# Patient Record
Sex: MALE | Race: WHITE | NOT HISPANIC OR LATINO | Employment: OTHER | URBAN - METROPOLITAN AREA
[De-identification: names, ages, dates, MRNs, and addresses within clinical notes are randomized per-mention and may not be internally consistent; named-entity substitution may affect disease eponyms.]

---

## 2017-04-18 PROBLEM — G43.909 MIGRAINE: Status: ACTIVE | Noted: 2017-04-18

## 2017-04-18 PROBLEM — F41.8 SITUATIONAL ANXIETY: Status: ACTIVE | Noted: 2017-04-18

## 2017-11-21 PROBLEM — E78.00 HYPERCHOLESTEROLEMIA: Status: ACTIVE | Noted: 2017-11-21

## 2017-11-21 PROBLEM — G43.009 MIGRAINE WITHOUT AURA AND WITHOUT STATUS MIGRAINOSUS, NOT INTRACTABLE: Status: ACTIVE | Noted: 2017-11-21

## 2017-12-21 ENCOUNTER — TELEPHONE (OUTPATIENT)
Dept: NEUROLOGY | Facility: CLINIC | Age: 37
End: 2017-12-21

## 2017-12-21 NOTE — TELEPHONE ENCOUNTER
Called pt in regards to referral. Pt stated he is not ready to schedule appointment yet, stated will call back at a later time.

## 2018-09-10 ENCOUNTER — TELEPHONE (OUTPATIENT)
Dept: NEUROLOGY | Facility: CLINIC | Age: 38
End: 2018-09-10

## 2018-09-10 NOTE — TELEPHONE ENCOUNTER
----- Message from Dayana Jimenes sent at 9/10/2018 12:34 PM CDT -----  Contact: Patient's fianceNancy  Type:  Sooner Apoointment Request    Caller is requesting a sooner appointment.  Caller declined first available appointment listed below.  Caller will not accept being placed on the waitlist and is requesting a message be sent to doctor.    Name of Caller:  Patient's fiance  When is the first available appointment?  1/16  Symptoms:  Migraines  Best Call Back Number:   or   Additional Information:

## 2018-09-10 NOTE — TELEPHONE ENCOUNTER
Called and spoke with patients dorothy and let her know I added patient to the wait list for sooner appointment. She expressed understanding.

## 2018-12-20 ENCOUNTER — TELEPHONE (OUTPATIENT)
Dept: PHARMACY | Facility: CLINIC | Age: 38
End: 2018-12-20

## 2018-12-20 ENCOUNTER — OFFICE VISIT (OUTPATIENT)
Dept: NEUROLOGY | Facility: CLINIC | Age: 38
End: 2018-12-20
Payer: COMMERCIAL

## 2018-12-20 ENCOUNTER — TELEPHONE (OUTPATIENT)
Dept: NEUROLOGY | Facility: CLINIC | Age: 38
End: 2018-12-20

## 2018-12-20 VITALS
WEIGHT: 192.38 LBS | HEART RATE: 55 BPM | SYSTOLIC BLOOD PRESSURE: 100 MMHG | DIASTOLIC BLOOD PRESSURE: 67 MMHG | HEIGHT: 69 IN | RESPIRATION RATE: 17 BRPM | BODY MASS INDEX: 28.49 KG/M2

## 2018-12-20 DIAGNOSIS — F41.8 SITUATIONAL ANXIETY: ICD-10-CM

## 2018-12-20 DIAGNOSIS — E78.01 FAMILIAL HYPERCHOLESTEROLEMIA: ICD-10-CM

## 2018-12-20 DIAGNOSIS — G43.719 INTRACTABLE CHRONIC MIGRAINE WITHOUT AURA AND WITHOUT STATUS MIGRAINOSUS: ICD-10-CM

## 2018-12-20 DIAGNOSIS — G43.719 INTRACTABLE CHRONIC MIGRAINE WITHOUT AURA AND WITHOUT STATUS MIGRAINOSUS: Primary | ICD-10-CM

## 2018-12-20 DIAGNOSIS — G44.89 CHRONIC MIXED HEADACHE SYNDROME: ICD-10-CM

## 2018-12-20 PROCEDURE — 3008F BODY MASS INDEX DOCD: CPT | Mod: CPTII,S$GLB,, | Performed by: PSYCHIATRY & NEUROLOGY

## 2018-12-20 PROCEDURE — 99999 PR PBB SHADOW E&M-EST. PATIENT-LVL IV: CPT | Mod: PBBFAC,,, | Performed by: PSYCHIATRY & NEUROLOGY

## 2018-12-20 PROCEDURE — 99205 OFFICE O/P NEW HI 60 MIN: CPT | Mod: S$GLB,,, | Performed by: PSYCHIATRY & NEUROLOGY

## 2018-12-20 RX ORDER — KETOROLAC TROMETHAMINE 15.75 MG/1
15.75 SPRAY, METERED NASAL EVERY 6 HOURS
Qty: 5 EACH | Refills: 5 | Status: SHIPPED | OUTPATIENT
Start: 2018-12-20 | End: 2019-07-03 | Stop reason: SDUPTHER

## 2018-12-20 RX ORDER — ZOLMITRIPTAN 5 MG/1
TABLET, ORALLY DISINTEGRATING ORAL
Refills: 14 | COMMUNITY
Start: 2018-11-30 | End: 2019-07-22

## 2018-12-20 RX ORDER — BUTALBITAL, ACETAMINOPHEN AND CAFFEINE 50; 325; 40 MG/1; MG/1; MG/1
TABLET ORAL
Qty: 10 TABLET | Refills: 3 | Status: SHIPPED | OUTPATIENT
Start: 2018-12-20 | End: 2018-12-20 | Stop reason: SDUPTHER

## 2018-12-20 RX ORDER — BUTALBITAL, ACETAMINOPHEN AND CAFFEINE 50; 325; 40 MG/1; MG/1; MG/1
TABLET ORAL
Qty: 10 TABLET | Refills: 3 | Status: SHIPPED | OUTPATIENT
Start: 2018-12-20 | End: 2019-10-09 | Stop reason: SDUPTHER

## 2018-12-20 NOTE — PROGRESS NOTES
Subjective:       Patient ID: Anirudh Stokes is a 38 y.o. male.    Chief Complaint: Headache    HPI   The patient is a pleasant 39 y/o male who comes accompanied by his wife with chief complaint of headache. He has had headaches since he was in [employer]. In time they subsided but they recurred and are now debilitating. He is aware of triggers and avoids them he eats healthy, has lost 25 lbs, he does meditation, stretching, walks and is aware of the importance of lifestyle changes. He has not had an MRI of the brain. He had blood work last year which was significant for hyperlipidemia. Allergy testing reveals that he is allergic to dust, apple, cinnamon, amongst others. He took Topamax for a couple of weeks in the past. He is on Zoloft.  Please see details of headache characteristics below.  Headache questionnaire    1. When did your Headaches start?    Not sure      2. Where are your headaches located?   Not sure      3. Your headache's characteristics:   Throbbing, Pounding, Like a tight band      4. How long does the headache last?   3 Days      5. How often does the headache occur?   weekly      6. Are your headaches preceded or accompanied by other symptoms? yes   If yes, please describe.        7. Does the headache awaken you at night? yes   If so, how often? Only when its really bad. 3 day castro for example        8. Please gage the word that best describes your headache's intensity:    severe      9. Using a scale of 1 through 10, with 0 = no pain and 10 = the worst pain:   What score is your headache now? 0   What score is your headache at its worst? 9   What score is your headache at its best? 0        10. Possible associated headache symptoms:  [x]  Sensitivity to light  [] Dizziness  [x] Nasal or sinus pressure/ pain   [] Sensitivity to noise  [] Vertigo  [x] Problems with concentration  [x] Sensitivity to smells  [] Ringing in ears  [] Problems with memory    [] Blurred vision  [] Irritability  [x]  Problems with task completion   [] Double vision  [] Anger  [x]  Problems with relaxation  [] Loss of appetite  [] Anxiety  [x] Neck tightness, Neck pain  [] Nausea   [] Nasal congestion  [] Vomiting         11. Headache improving factors:  [x] Sleep  [] Heat  [] Darkness  [] Ice  [] Local pressure [] Menses (period)  [x] Massage   [x] Medications:        12. Headache worsening factors:   [] Fatigue [] Sneezing  [x] Changes in Weather  [] Light [] Bending Over [x] Stress  [] Noise [] Ovulation  [] Multiple Sclerosis Flare-Up  [x] Smells  [] Menses  [] Food   [] Coughing [] Alcohol      13. Number of caffeinated drinks per day: 2    14. Number of diet drinks per day:  0      15. Have you seen any other [facility] within the last 3 years?  No  Please Check any Medications Tried for Headache    AED Neuromodulators  MAOIs  Ergot Alkaloids    Acetazolamide (Diamox) [] Phenelzine (Nardil) [] Dihydroergotamine (Migranal) []   Carbamazepine (Tegretol) [] Tranylcypromine (Parnate) [] Ergotamine (Ergomar) []   Gabapentin (Neurontin) [] Antihistamine/Serotonergic  Triptans    Lacosamide (Vimpat) [] Cyproheptadine (Periactin) [] Almotriptan (Axert) []   Lamotrigine (Lamictal) [] Antihypertensives  Eletriptan (Relpax) []   Levatiracetam (Keppra) [] Atenolol (Tenormin) [] Frovatriptan (Frova) []   Oxcarbazepine (Trileptal) [] Bisoprostol (Zebeta) [] Naratriptan (Amerge) []   Phenobarbital [] Candesartan (Atacand) [] Rizatriptan (Maxalt) [x]   Phenytoin (Dilantin) [] Diltiazem (Cardizem) [] Sumatriptan (Imitrex) [x]   Pregabalin (Lyrica) [] Lisinopril (Prinivil, Zestril) [] Zolmitriptan (Zomig) [x]   Primidone (Mysoline) [] Metoprolol (Toprol) [] Combo Abortives    Tiagabine (Gabatril) [] Nadolol (Corgard) [] Butalbital and Acetaminophen (Bupap) []   Topiramate (Topamax)  (Trokendi) [x] Nicardipine (Cardene) []     Vigabatrin (Sabril) [] Nimodipine (Nimotop) [] Butalbital, Acetaminophen, and caffeine (Fioricet) []    Valproic Acid (Depakote) (Divalproex Sodium) [] Propranolol (Inderal) []     Zonisamide (Zonegran) [] Telmisartan (Micardis) [] Butalbital, Aspirin, and caffeine (Fiorinal) []   Benzodiazepines  Timolol (Blocadren) []     Alprazolam (Xanax) [] Verapamil (Calan, Verelan) [] Butalbital, Caffeine, Acetaminophen, and Codeine (Fioricet with Codeine) []   Diazepam (Valium) [] NSAIDs      Lorazepam (Ativan) [] Acetaminophen (Tylenol) [x]     Clonazepam (Klonopin) [] Acetylsalicylic Acid (Aspirin) [x] Butalbital, Caffeine, Aspirin, and Codeine  (Fiorinal with Codeine) []   Antidepressants  Diclofenac (Cambia) []     Amitriptyline (Elavil) [] Ibuprofen (Motrin) []     Desipramine (Norpramin) [] Indomethacin (Indocin) [] Aspirin, Caffeine, and Acetaminophen (Excedrin) (Goodys) [x]   Doxepin (Sinequan) [] Ketoprofen (Orudis) []     Fluoxetine (Prozac) [] Ketorolac (Toradol) [] Acetaminophen, Dichloralphenazone, and Isometheptene (Midrin) []   Imipramine (Tofranil) [] Naproxen (Anaprox) (Aleve) [x]     Nortriptyline (Pamelor) [] Meclofenamic Acid (Meclomen) []     Venlafaxine (Effexor) [] Meloxicam (Mobic) [] Aspirin, Salicylamide, and Caffeine (BC Powder) [x]       Review of Systems   Constitutional: Negative for activity change, appetite change, chills, fatigue and fever.   HENT: Negative for congestion, dental problem, ear pain, hearing loss, sinus pressure, tinnitus, trouble swallowing and voice change.    Eyes: Negative for photophobia, pain and visual disturbance.   Respiratory: Negative for cough, chest tightness and shortness of breath.    Cardiovascular: Negative for chest pain, palpitations and leg swelling.   Gastrointestinal: Negative for abdominal pain, blood in stool, constipation, diarrhea, nausea and vomiting.   Endocrine: Negative for cold intolerance and heat intolerance.   Genitourinary: Negative for difficulty urinating, dysuria and urgency.   Musculoskeletal: Negative for arthralgias, back pain, gait  problem, myalgias, neck pain and neck stiffness.   Skin: Negative for color change, pallor and rash.   Neurological: Positive for headaches. Negative for dizziness, tremors, seizures, syncope, facial asymmetry, speech difficulty, weakness, light-headedness and numbness.   Hematological: Negative for adenopathy. Does not bruise/bleed easily.   Psychiatric/Behavioral: Negative for agitation, behavioral problems, confusion, decreased concentration, self-injury, sleep disturbance and suicidal ideas. The patient is not nervous/anxious.                Past Medical History:   Diagnosis Date    Allergic to bees     Migraine      History reviewed. No pertinent surgical history.  Family History   Problem Relation Age of Onset    Diabetes Father     Colon polyps Father     Colon cancer Paternal Grandfather      Social History     Socioeconomic History    Marital status: Single     Spouse name: Not on file    Number of children: Not on file    Years of education: Not on file    Highest education level: Not on file   Social Needs    Financial resource strain: Not on file    Food insecurity - worry: Not on file    Food insecurity - inability: Not on file    Transportation needs - medical: Not on file    Transportation needs - non-medical: Not on file   Occupational History    Not on file   Tobacco Use    Smoking status: Never Smoker    Smokeless tobacco: Never Used   Substance and Sexual Activity    Alcohol use: Yes     Alcohol/week: 0.6 oz     Types: 1 Shots of liquor per week     Comment: once or twice on the weekend    Drug use: No    Sexual activity: Yes     Partners: Female   Other Topics Concern    Not on file   Social History Narrative    Not on file     Review of patient's allergies indicates:   Allergen Reactions    Apple     Bee pollens     Gluten protein     Nuts [tree nut]     Shellfish containing products     Wheat containing prod        Current Outpatient Medications:     epinephrine  (EPIPEN JR) 0.15 mg/0.3 mL pen injection, Inject 0.15 mg into the muscle as needed for Anaphylaxis., Disp: , Rfl:     levocetirizine (XYZAL) 5 MG tablet, Take 5 mg by mouth every evening., Disp: , Rfl:     pseudoephedrine-guaifenesin  mg (MUCINEX D)  mg per tablet, Take 1 tablet by mouth every 12 (twelve) hours., Disp: , Rfl:     rizatriptan (MAXALT) 10 MG tablet, TK 1 T PO PRF HA, Disp: , Rfl: 1    sertraline (ZOLOFT) 50 MG tablet, TAKE 1 TABLET(50 MG) BY MOUTH EVERY DAY, Disp: 30 tablet, Rfl: 14    ZOLMitriptan (ZOMIG-ZMT) 5 MG disintegrating tablet, DIS 1 T ON THE TONGUE AOS OF HEADACHE. MAY REPEAT IN 2 H. NTE 2 TS PER 24 H, Disp: , Rfl: 14    butalbital-acetaminophen-caffeine -40 mg (FIORICET, ESGIC) -40 mg per tablet, Take if Sprix and Zomig ineffective., Disp: 10 tablet, Rfl: 3    galcanezumab-gnlm (EMGALITY) 120 mg/mL PnIj, Inject 120 mg into the skin every 28 days., Disp: 1 mL, Rfl: 5    ketorolac (SPRIX) 15.75 mg/spray Spry, 15.75 mg by Nasal route every 6 (six) hours., Disp: 5 each, Rfl: 5    ZOLMitriptan (ZOMIG) 5 MG tablet, Take 1 at onset of migraine, may repeat in 2 hrs if needed; not to exceed 2 per 24 hrs., Disp: 10 tablet, Rfl: 6      Objective:      Vitals:    12/20/18 0934   BP: 100/67   Pulse: (!) 55   Resp:      Body mass index is 28.41 kg/m².    Physical Exam    Constitutional:     He appears well-developed and well-nourished. He is well groomed  HENT:    Head: Atraumatic, oral and nasal mucosa intact  Eyes: Conjunctivae and EOM are normal. Pupils are equal, round, and reactive to light OU  Neck: Neck supple. No thyromegaly present  Cardiovascular: Normal rate and normal heart sounds  No murmur heard  Pulmonary/Chest: Effort normal and breath sounds normal  Musculoskeletal: Normal range of motion. No joint stiffness. No vertebral point tenderness  Skin: Skin is warm and dry  Psychiatric: Normal mood and affect     Neuro exam:    Mental status:  Awake, attentive,  Alert, oriented to self, place, year and month  Language function is intact  Naming, repetition and spontaneous meaningful speech expression are intact  Speech fluency is good and speech is clear  Remote and recent memory are good  Patient able to count backwards by 7    No findings to suggest executive dysfuntion    Patient has adequate insight      Cranial Nerves:  Smell was not formally evaluated  Cranial Nerves II - XII: intact  Pursuits were smooth, normal saccades, no nystagmus OU  Funduscopic exam - disc were flat and pink, no exudates or hemorrhages OU  Motor - facial movement was symmetrical and normal     Palate moved well and was symmetrical with normal palatal and oral sensation  Tongue movements were full    Coordination:     Rapid alternating movements and rapid finger tapping - normal bilaterally  Finger to nose - normal and symmetric bilaterally   Heel to shin test - normal and symmetric bilaterally   Arm roll - smooth and symmetric   No intentional or positional tremor.     Motor:  Normal muscle bulk and symmetry. No fasciculations were noted    No pronator drift  Strength 5/5 bilaterally     Reflexes:  Tendon reflexes were 2 + at biceps, triceps, brachioradialis, patellar, and Achilles bilaterally  On plantar stimulation toes were down going bilaterally  No clonus was noted     Sensory: Intact to light touch, pin prick in all extremities.     Gait: Romberg absent. Normal gait.     Review of Data:  Lab Results   Component Value Date     05/17/2017    K 4.5 05/17/2017    CL 96 05/17/2017    CO2 21 05/17/2017    BUN 21 (H) 05/17/2017    CREATININE 0.94 05/17/2017    GLU 86 05/17/2017    AST 27 05/17/2017    ALT 33 05/17/2017    ALBUMIN 5.0 05/17/2017    PROT 7.3 05/17/2017    BILITOT 0.5 05/17/2017    CHOL 312 (H) 05/17/2017    HDL 51 05/17/2017    LDLCALC 213 (H) 05/17/2017    TRIG 240 (H) 05/17/2017       Lab Results   Component Value Date    WBC 5.2 05/17/2017    HGB 14.7 05/17/2017    HCT  44.2 05/17/2017    MCV 91 05/17/2017     05/17/2017           Assessment and Plan   Chronic mixed headache syndrome  -     MRI Brain Without Contrast; Future; Expected date: 12/20/2018  -     CBC auto differential; Future; Expected date: 12/20/2018  -     Comprehensive metabolic panel; Future; Expected date: 12/20/2018  -     TSH; Future; Expected date: 12/20/2018  Familial hypercholesterolemia  -     LIPID PANEL; Future; Expected date: 12/20/2018  Other orders  -     ketorolac (SPRIX) 15.75 mg/spray Spry; 15.75 mg by Nasal route every 6 (six) hours.  Dispense: 5 each; Refill: 5  -     galcanezumab-gnlm (EMGALITY) 120 mg/mL PnIj; Inject 120 mg into the skin every 28 days.  Dispense: 1 mL; Refill: 5  -     Discontinue: butalbital-acetaminophen-caffeine -40 mg (FIORICET, ESGIC) -40 mg per tablet; Take if Sprix and Zomig ineffective.  Dispense: 10 tablet; Refill: 3    I have discussed the side effects of the medications prescribed and the patient acknowledges understanding    Counseling:  More than 50% of the 60 minute encounter was spent face to face counseling the patient regarding current status and future plan of care as well as side of the medications. All questions were answered to patient's satisfaction            Tashi Munoz M.D  Medical Director, Headache and Facial Pain  [facility]

## 2018-12-20 NOTE — PROGRESS NOTES
Headache questionnaire    1. When did your Headaches start?    Not sure      2. Where are your headaches located?   Not sure      3. Your headache's characteristics:   Throbbing, Pounding, Like a tight band      4. How long does the headache last?   3 Days      5. How often does the headache occur?   weekly      6. Are your headaches preceded or accompanied by other symptoms? yes   If yes, please describe.        7. Does the headache awaken you at night? yes   If so, how often? Only when its really bad. 3 day castro for example        8. Please gage the word that best describes your headache's intensity:    severe      9. Using a scale of 1 through 10, with 0 = no pain and 10 = the worst pain:   What score is your headache now? 0   What score is your headache at its worst? 9   What score is your headache at its best? 0        10. Possible associated headache symptoms:  [x]  Sensitivity to light  [] Dizziness  [x] Nasal or sinus pressure/ pain   [] Sensitivity to noise  [] Vertigo  [x] Problems with concentration  [x] Sensitivity to smells  [] Ringing in ears  [] Problems with memory    [] Blurred vision  [] Irritability  [x] Problems with task completion   [] Double vision  [] Anger  [x]  Problems with relaxation  [] Loss of appetite  [] Anxiety  [x] Neck tightness, Neck pain  [] Nausea   [] Nasal congestion  [] Vomiting         11. Headache improving factors:  [x] Sleep  [] Heat  [] Darkness  [] Ice  [] Local pressure [] Menses (period)  [x] Massage   [x] Medications:        12. Headache worsening factors:   [] Fatigue [] Sneezing  [x] Changes in Weather  [] Light [] Bending Over [x] Stress  [] Noise [] Ovulation  [] Multiple Sclerosis Flare-Up  [x] Smells  [] Menses  [] Food   [] Coughing [] Alcohol      13. Number of caffeinated drinks per day: 2    14. Number of diet drinks per day:  0      15. Have you seen any other [facility]ists within the last 3 years?  No  Please Check any Medications Tried for  Headache    AED Neuromodulators  MAOIs  Ergot Alkaloids    Acetazolamide (Diamox) [] Phenelzine (Nardil) [] Dihydroergotamine (Migranal) []   Carbamazepine (Tegretol) [] Tranylcypromine (Parnate) [] Ergotamine (Ergomar) []   Gabapentin (Neurontin) [] Antihistamine/Serotonergic  Triptans    Lacosamide (Vimpat) [] Cyproheptadine (Periactin) [] Almotriptan (Axert) []   Lamotrigine (Lamictal) [] Antihypertensives  Eletriptan (Relpax) []   Levatiracetam (Keppra) [] Atenolol (Tenormin) [] Frovatriptan (Frova) []   Oxcarbazepine (Trileptal) [] Bisoprostol (Zebeta) [] Naratriptan (Amerge) []   Phenobarbital [] Candesartan (Atacand) [] Rizatriptan (Maxalt) [x]   Phenytoin (Dilantin) [] Diltiazem (Cardizem) [] Sumatriptan (Imitrex) [x]   Pregabalin (Lyrica) [] Lisinopril (Prinivil, Zestril) [] Zolmitriptan (Zomig) [x]   Primidone (Mysoline) [] Metoprolol (Toprol) [] Combo Abortives    Tiagabine (Gabatril) [] Nadolol (Corgard) [] Butalbital and Acetaminophen (Bupap) []   Topiramate (Topamax)  (Trokendi) [x] Nicardipine (Cardene) []     Vigabatrin (Sabril) [] Nimodipine (Nimotop) [] Butalbital, Acetaminophen, and caffeine (Fioricet) []   Valproic Acid (Depakote) (Divalproex Sodium) [] Propranolol (Inderal) []     Zonisamide (Zonegran) [] Telmisartan (Micardis) [] Butalbital, Aspirin, and caffeine (Fiorinal) []   Benzodiazepines  Timolol (Blocadren) []     Alprazolam (Xanax) [] Verapamil (Calan, Verelan) [] Butalbital, Caffeine, Acetaminophen, and Codeine (Fioricet with Codeine) []   Diazepam (Valium) [] NSAIDs      Lorazepam (Ativan) [] Acetaminophen (Tylenol) [x]     Clonazepam (Klonopin) [] Acetylsalicylic Acid (Aspirin) [x] Butalbital, Caffeine, Aspirin, and Codeine  (Fiorinal with Codeine) []   Antidepressants  Diclofenac (Cambia) []     Amitriptyline (Elavil) [] Ibuprofen (Motrin) []     Desipramine (Norpramin) [] Indomethacin (Indocin) [] Aspirin, Caffeine, and Acetaminophen (Excedrin) (Goodys) [x]   Doxepin (Sinequan)  [] Ketoprofen (Orudis) []     Fluoxetine (Prozac) [] Ketorolac (Toradol) [] Acetaminophen, Dichloralphenazone, and Isometheptene (Midrin) []   Imipramine (Tofranil) [] Naproxen (Anaprox) (Aleve) [x]     Nortriptyline (Pamelor) [] Meclofenamic Acid (Meclomen) []     Venlafaxine (Effexor) [] Meloxicam (Mobic) [] Aspirin, Salicylamide, and Caffeine (BC Powder) [x]

## 2018-12-20 NOTE — LETTER
December 20, 2018      KSENIA Dunne Jr., MD  [address]  [facility] [address]           [facility] [place]  [address] [facility] [address] [place] [address]  Phone: [phone]  Fax: [phone]          Patient: Anirudh Stokes   MR Number: [id number]   YOB: 1980   Date of Visit: 12/20/2018       Dear Dr. KSENIA Dunne Jr.:    Thank you for referring Anirudh Stokes to me for evaluation. Attached you will find relevant portions of my assessment and plan of care.    If you have questions, please do not hesitate to call me. I look forward to following Anirudh Stokes along with you.    Sincerely,    Funmilayo Munoz MD    Enclosure  CC:  No Recipients    If you would like to receive this communication electronically, please contact [email or web address] or [phone] to request more information on [facility] access.    For providers and/or their staff who would like to refer a patient to [facility], please contact us through our one-stop-shop provider referral line, [facility], at [phone].    If you feel you have received this communication in error or would no longer like to receive these types of communications, please e-mail [email or web address]

## 2018-12-20 NOTE — TELEPHONE ENCOUNTER
Called patient in regards to do a well fare check per provider. Patient did not answer , left voicemail to return call.

## 2018-12-20 NOTE — PATIENT INSTRUCTIONS
PLAN:  MRI of the brain  CBC, CMP, TSH, Lipid panel  Prevention:  1) Emgality, loading dose of 240 mg SC, then 120 mg SC every 28 thereafter  2) Flexeril (cyclobenzaprine) 10 mg po QHS  Treatment of acute attack:  Sprix alternating with Zomig or both, Sprix plus Zomig for severe attacks  Fioricet for rescue if headache persists in spite of use of Sprix and Zomig

## 2018-12-21 ENCOUNTER — TELEPHONE (OUTPATIENT)
Dept: NEUROLOGY | Facility: CLINIC | Age: 38
End: 2018-12-21

## 2018-12-21 NOTE — TELEPHONE ENCOUNTER
DOCUMENTATION ONLY:  Prior Authorization for Emgality approved from 12/21/18 to 12/21/19    Case Id: [id number]    Co-pay: $610.32    Patient Assistance IS required and is being researched.     -ARR

## 2018-12-22 ENCOUNTER — LAB VISIT (OUTPATIENT)
Dept: LAB | Facility: HOSPITAL | Age: 38
End: 2018-12-22
Attending: PSYCHIATRY & NEUROLOGY
Payer: COMMERCIAL

## 2018-12-22 DIAGNOSIS — G44.89 CHRONIC MIXED HEADACHE SYNDROME: ICD-10-CM

## 2018-12-22 DIAGNOSIS — E78.01 FAMILIAL HYPERCHOLESTEROLEMIA: ICD-10-CM

## 2018-12-22 LAB
ALBUMIN SERPL BCP-MCNC: 4.2 G/DL
ALP SERPL-CCNC: 55 U/L
ALT SERPL W/O P-5'-P-CCNC: 30 U/L
ANION GAP SERPL CALC-SCNC: 8 MMOL/L
AST SERPL-CCNC: 23 U/L
BASOPHILS # BLD AUTO: 0.06 K/UL
BASOPHILS NFR BLD: 1.1 %
BILIRUB SERPL-MCNC: 0.6 MG/DL
BUN SERPL-MCNC: 18 MG/DL
CALCIUM SERPL-MCNC: 10 MG/DL
CHLORIDE SERPL-SCNC: 101 MMOL/L
CHOLEST SERPL-MCNC: 292 MG/DL
CHOLEST/HDLC SERPL: 5.4 {RATIO}
CO2 SERPL-SCNC: 29 MMOL/L
CREAT SERPL-MCNC: 1.1 MG/DL
DIFFERENTIAL METHOD: NORMAL
EOSINOPHIL # BLD AUTO: 0.1 K/UL
EOSINOPHIL NFR BLD: 2.6 %
ERYTHROCYTE [DISTWIDTH] IN BLOOD BY AUTOMATED COUNT: 12.8 %
EST. GFR  (AFRICAN AMERICAN): >60 ML/MIN/1.73 M^2
EST. GFR  (NON AFRICAN AMERICAN): >60 ML/MIN/1.73 M^2
GLUCOSE SERPL-MCNC: 98 MG/DL
HCT VFR BLD AUTO: 42.1 %
HDLC SERPL-MCNC: 54 MG/DL
HDLC SERPL: 18.5 %
HGB BLD-MCNC: 14.5 G/DL
IMM GRANULOCYTES # BLD AUTO: 0.01 K/UL
IMM GRANULOCYTES NFR BLD AUTO: 0.2 %
LDLC SERPL CALC-MCNC: 204.8 MG/DL
LYMPHOCYTES # BLD AUTO: 2 K/UL
LYMPHOCYTES NFR BLD: 36.1 %
MCH RBC QN AUTO: 29.7 PG
MCHC RBC AUTO-ENTMCNC: 34.4 G/DL
MCV RBC AUTO: 86 FL
MONOCYTES # BLD AUTO: 0.4 K/UL
MONOCYTES NFR BLD: 7.5 %
NEUTROPHILS # BLD AUTO: 2.9 K/UL
NEUTROPHILS NFR BLD: 52.5 %
NONHDLC SERPL-MCNC: 238 MG/DL
NRBC BLD-RTO: 0 /100 WBC
PLATELET # BLD AUTO: 192 K/UL
PMV BLD AUTO: 10.7 FL
POTASSIUM SERPL-SCNC: 4.4 MMOL/L
PROT SERPL-MCNC: 7.4 G/DL
RBC # BLD AUTO: 4.88 M/UL
SODIUM SERPL-SCNC: 138 MMOL/L
TRIGL SERPL-MCNC: 166 MG/DL
TSH SERPL DL<=0.005 MIU/L-ACNC: 2.11 UIU/ML
WBC # BLD AUTO: 5.48 K/UL

## 2018-12-22 PROCEDURE — 84443 ASSAY THYROID STIM HORMONE: CPT

## 2018-12-22 PROCEDURE — 80061 LIPID PANEL: CPT

## 2018-12-22 PROCEDURE — 80053 COMPREHEN METABOLIC PANEL: CPT

## 2018-12-22 PROCEDURE — 85025 COMPLETE CBC W/AUTO DIFF WBC: CPT

## 2018-12-22 PROCEDURE — 36415 COLL VENOUS BLD VENIPUNCTURE: CPT | Mod: PO

## 2019-01-03 ENCOUNTER — TELEPHONE (OUTPATIENT)
Dept: NEUROLOGY | Facility: CLINIC | Age: 39
End: 2019-01-03

## 2019-01-17 ENCOUNTER — TELEPHONE (OUTPATIENT)
Dept: PHARMACY | Facility: CLINIC | Age: 39
End: 2019-01-17

## 2019-01-17 NOTE — TELEPHONE ENCOUNTER
Initial Emgality consult completed on . Emgality 120mg will be shipped on  to arrive at patient's home on  via [employer]. $0 copay. Patient injected 1st dose on  and will inject 2nd dose once received on . Address confirmed. Confirmed 2 patient identifiers - name and . Therapy Appropriate.    --Injection experience: Emgality  Informed patient on online injection video on  website.    Counseled patient on administration directions:  - Inject one injection (120mg) once every 28 days thereafter.   - Store in refrigerator prior to use (do not freeze, do not shake, keep in original box until use).   - Take out of the refrigerator 30 minutes prior to injection to reach room temperature.  - Wash hands before and after injection.  - Monthly RX will come with gauze, band aids, and alcohol swabs.  - Patient may self-inject in either the front/top of the thighs, abdomen- but at least 2 inches away from belly button   - If someone else is giving the injection, they may also use the outer part of your upper arm or your buttocks.   - Patient was instructed to rotate injections sites monthly.  - Inspect medication - should be clear and colorless to slightly yellow to slightly brown.   - Patient is to wipe down the injection site with the alcohol pad, wait to dry.    - Remove white base cap from pen (twist to remove).  - Place the pen flat against the injection site and turn the lock ring to the unlocked position then push down and hold the teal button - there will be an initial click; in 10 seconds you will hear a second click.  - Remove the pen from skin and check to see if the gray plunger is visible - this will indicate that the injection is complete.   - Patient will use sharps container; once full, per state law, she/he may securely lock the sharps container and place in trash. Pharmacy will replace the sharps at no additional charge.    Patient was counseled on possible side effects:  -  Injection site reaction: redness, soreness, itching, bruising, which should resolve within 3-5 days.  - Allergic reactions: itching, rash, hives, swelling of face, mouth, tongue, throat, trouble breathing.     Advised patient to keep a calendar to stay compliant.   Consultation included: indication; goals of treatment; administration; storage and handling; side effects; how to handle side effects; the importance of compliance; the importance of keeping all follow up appointments.  Patient understands to report any medication changes to [facility] and provider. All questions answered and addressed to patients satisfaction. I will f/u with patient in 7-10 days from start, [facility] to contact patient in 3 weeks for refills.    InBasket sent 12:18 pm on 1/17/19    Miguel Jasso, PharmD  Clinical Pharmacist   [facility]   P: [phone]

## 2019-02-07 ENCOUNTER — TELEPHONE (OUTPATIENT)
Dept: PHARMACY | Facility: CLINIC | Age: 39
End: 2019-02-07

## 2019-02-07 NOTE — TELEPHONE ENCOUNTER
Patient reached regard specialty medication refill for Emgality 120mg/mL (1mL/28) $0/004- Patient scheduled to have medication ship out on Tues 2/12 to arrive on Wed 2/13 address confirmed/prescription address. Patient informed no new medications, conditions or allergies since last talked to [facility]. Patient have no injection remaining & no missed dose. Patient declined questions for the clinical pharmacist. Patient voiced understanding.     Next injection due on: 2/15  Prescription Address:   [address] [place] [address]   Yes

## 2019-02-14 ENCOUNTER — TELEPHONE (OUTPATIENT)
Dept: NEUROLOGY | Facility: CLINIC | Age: 39
End: 2019-02-14

## 2019-02-14 NOTE — TELEPHONE ENCOUNTER
----- Message from Dayana Jimenes sent at 2/14/2019  2:49 PM CST -----  Contact: Patient's wife, Freya  Patient's wife is calling to speak with the office regarding some labs that the patient had in December and also a nasal spray, ketorolac (SPRIX) 15.75 mg/spray Spry, for the patient.  Call Back#[phone]  Thanks

## 2019-02-14 NOTE — TELEPHONE ENCOUNTER
Returned wife's call in regards to billing for lab work. Informed wife I will call billing in the morning to help aid in this situation. Wife verbalized understanding.

## 2019-02-15 ENCOUNTER — TELEPHONE (OUTPATIENT)
Dept: NEUROLOGY | Facility: CLINIC | Age: 39
End: 2019-02-15

## 2019-03-06 ENCOUNTER — TELEPHONE (OUTPATIENT)
Dept: PHARMACY | Facility: CLINIC | Age: 39
End: 2019-03-06

## 2019-04-02 ENCOUNTER — TELEPHONE (OUTPATIENT)
Dept: PHARMACY | Facility: CLINIC | Age: 39
End: 2019-04-02

## 2019-04-03 ENCOUNTER — OFFICE VISIT (OUTPATIENT)
Dept: NEUROLOGY | Facility: CLINIC | Age: 39
End: 2019-04-03
Payer: COMMERCIAL

## 2019-04-03 VITALS
RESPIRATION RATE: 16 BRPM | BODY MASS INDEX: 29.26 KG/M2 | HEIGHT: 69 IN | HEART RATE: 74 BPM | DIASTOLIC BLOOD PRESSURE: 80 MMHG | WEIGHT: 197.56 LBS | SYSTOLIC BLOOD PRESSURE: 125 MMHG

## 2019-04-03 DIAGNOSIS — G43.719 INTRACTABLE CHRONIC MIGRAINE WITHOUT AURA AND WITHOUT STATUS MIGRAINOSUS: Primary | ICD-10-CM

## 2019-04-03 DIAGNOSIS — E78.01 FAMILIAL HYPERCHOLESTEROLEMIA: ICD-10-CM

## 2019-04-03 PROCEDURE — 99214 OFFICE O/P EST MOD 30 MIN: CPT | Mod: S$GLB,,, | Performed by: PSYCHIATRY & NEUROLOGY

## 2019-04-03 PROCEDURE — 99214 PR OFFICE/OUTPT VISIT, EST, LEVL IV, 30-39 MIN: ICD-10-PCS | Mod: S$GLB,,, | Performed by: PSYCHIATRY & NEUROLOGY

## 2019-04-03 PROCEDURE — 99999 PR PBB SHADOW E&M-EST. PATIENT-LVL IV: CPT | Mod: PBBFAC,,, | Performed by: PSYCHIATRY & NEUROLOGY

## 2019-04-03 PROCEDURE — 99999 PR PBB SHADOW E&M-EST. PATIENT-LVL IV: ICD-10-PCS | Mod: PBBFAC,,, | Performed by: PSYCHIATRY & NEUROLOGY

## 2019-04-03 PROCEDURE — 3008F BODY MASS INDEX DOCD: CPT | Mod: CPTII,S$GLB,, | Performed by: PSYCHIATRY & NEUROLOGY

## 2019-04-03 PROCEDURE — 3008F PR BODY MASS INDEX (BMI) DOCUMENTED: ICD-10-PCS | Mod: CPTII,S$GLB,, | Performed by: PSYCHIATRY & NEUROLOGY

## 2019-04-03 RX ORDER — CYCLOBENZAPRINE HCL 10 MG
TABLET ORAL
Refills: 1 | COMMUNITY
Start: 2019-02-16 | End: 2019-04-15 | Stop reason: SDUPTHER

## 2019-04-04 NOTE — PROGRESS NOTES
Subjective:       Patient ID: Anirudh Stokes is a 38 y.o. male.    Chief Complaint: Headache  INTERVAL HISTORY 4/3/2019  The patient comes for follow up. He is significantly improved on Emgality for prevention with a noticeable decrease in the frequency and severity of the headaches. For acute attacks he takes Sprix and Zomig, which works particularly well. He has not had the MRI of the brain as he was between insurance companies and unsure of coverage.    HPI   The patient is a pleasant 37 y/o male who comes accompanied by his wife with chief complaint of headache. He has had headaches since he was in [employer]. In time they subsided but they recurred and are now debilitating. He is aware of triggers and avoids them he eats healthy, has lost 25 lbs, he does meditation, stretching, walks and is aware of the importance of lifestyle changes. He has not had an MRI of the brain. He had blood work last year which was significant for hyperlipidemia. Allergy testing reveals that he is allergic to dust, apple, cinnamon, amongst others. He took Topamax for a couple of weeks in the past. He is on Zoloft.  Please see details of headache characteristics below.  Headache questionnaire    1. When did your Headaches start?    Not sure      2. Where are your headaches located?   Not sure      3. Your headache's characteristics:   Throbbing, Pounding, Like a tight band      4. How long does the headache last?   3 Days      5. How often does the headache occur?   weekly      6. Are your headaches preceded or accompanied by other symptoms? yes   If yes, please describe.        7. Does the headache awaken you at night? yes   If so, how often? Only when its really bad. 3 day castro for example        8. Please gage the word that best describes your headache's intensity:    severe      9. Using a scale of 1 through 10, with 0 = no pain and 10 = the worst pain:   What score is your headache now? 0   What score is your headache at its  worst? 9   What score is your headache at its best? 0        10. Possible associated headache symptoms:  [x]  Sensitivity to light  [] Dizziness  [x] Nasal or sinus pressure/ pain   [] Sensitivity to noise  [] Vertigo  [x] Problems with concentration  [x] Sensitivity to smells  [] Ringing in ears  [] Problems with memory    [] Blurred vision  [] Irritability  [x] Problems with task completion   [] Double vision  [] Anger  [x]  Problems with relaxation  [] Loss of appetite  [] Anxiety  [x] Neck tightness, Neck pain  [] Nausea   [] Nasal congestion  [] Vomiting         11. Headache improving factors:  [x] Sleep  [] Heat  [] Darkness  [] Ice  [] Local pressure [] Menses (period)  [x] Massage   [x] Medications:        12. Headache worsening factors:   [] Fatigue [] Sneezing  [x] Changes in Weather  [] Light [] Bending Over [x] Stress  [] Noise [] Ovulation  [] Multiple Sclerosis Flare-Up  [x] Smells  [] Menses  [] Food   [] Coughing [] Alcohol      13. Number of caffeinated drinks per day: 2    14. Number of diet drinks per day:  0      15. Have you seen any other [facility] within the last 3 years?  No  Please Check any Medications Tried for Headache    AED Neuromodulators  MAOIs  Ergot Alkaloids    Acetazolamide (Diamox) [] Phenelzine (Nardil) [] Dihydroergotamine (Migranal) []   Carbamazepine (Tegretol) [] Tranylcypromine (Parnate) [] Ergotamine (Ergomar) []   Gabapentin (Neurontin) [] Antihistamine/Serotonergic  Triptans    Lacosamide (Vimpat) [] Cyproheptadine (Periactin) [] Almotriptan (Axert) []   Lamotrigine (Lamictal) [] Antihypertensives  Eletriptan (Relpax) []   Levatiracetam (Keppra) [] Atenolol (Tenormin) [] Frovatriptan (Frova) []   Oxcarbazepine (Trileptal) [] Bisoprostol (Zebeta) [] Naratriptan (Amerge) []   Phenobarbital [] Candesartan (Atacand) [] Rizatriptan (Maxalt) [x]   Phenytoin (Dilantin) [] Diltiazem (Cardizem) [] Sumatriptan (Imitrex) [x]   Pregabalin (Lyrica) [] Lisinopril  (Prinivil, Zestril) [] Zolmitriptan (Zomig) [x]   Primidone (Mysoline) [] Metoprolol (Toprol) [] Combo Abortives    Tiagabine (Gabatril) [] Nadolol (Corgard) [] Butalbital and Acetaminophen (Bupap) []   Topiramate (Topamax)  (Trokendi) [x] Nicardipine (Cardene) []     Vigabatrin (Sabril) [] Nimodipine (Nimotop) [] Butalbital, Acetaminophen, and caffeine (Fioricet) []   Valproic Acid (Depakote) (Divalproex Sodium) [] Propranolol (Inderal) []     Zonisamide (Zonegran) [] Telmisartan (Micardis) [] Butalbital, Aspirin, and caffeine (Fiorinal) []   Benzodiazepines  Timolol (Blocadren) []     Alprazolam (Xanax) [] Verapamil (Calan, Verelan) [] Butalbital, Caffeine, Acetaminophen, and Codeine (Fioricet with Codeine) []   Diazepam (Valium) [] NSAIDs      Lorazepam (Ativan) [] Acetaminophen (Tylenol) [x]     Clonazepam (Klonopin) [] Acetylsalicylic Acid (Aspirin) [x] Butalbital, Caffeine, Aspirin, and Codeine  (Fiorinal with Codeine) []   Antidepressants  Diclofenac (Cambia) []     Amitriptyline (Elavil) [] Ibuprofen (Motrin) []     Desipramine (Norpramin) [] Indomethacin (Indocin) [] Aspirin, Caffeine, and Acetaminophen (Excedrin) (Goodys) [x]   Doxepin (Sinequan) [] Ketoprofen (Orudis) []     Fluoxetine (Prozac) [] Ketorolac (Toradol) [] Acetaminophen, Dichloralphenazone, and Isometheptene (Midrin) []   Imipramine (Tofranil) [] Naproxen (Anaprox) (Aleve) [x]     Nortriptyline (Pamelor) [] Meclofenamic Acid (Meclomen) []     Venlafaxine (Effexor) [] Meloxicam (Mobic) [] Aspirin, Salicylamide, and Caffeine (BC Powder) [x]       Review of Systems   Constitutional: Negative for activity change, appetite change, chills, fatigue and fever.   HENT: Negative for congestion, dental problem, ear pain, hearing loss, sinus pressure, tinnitus, trouble swallowing and voice change.    Eyes: Negative for photophobia, pain and visual disturbance.   Respiratory: Negative for cough, chest tightness and shortness of breath.    Cardiovascular:  Negative for chest pain, palpitations and leg swelling.   Gastrointestinal: Negative for abdominal pain, blood in stool, constipation, diarrhea, nausea and vomiting.   Endocrine: Negative for cold intolerance and heat intolerance.   Genitourinary: Negative for difficulty urinating, dysuria and urgency.   Musculoskeletal: Negative for arthralgias, back pain, gait problem, myalgias, neck pain and neck stiffness.   Skin: Negative for color change, pallor and rash.   Neurological: Positive for headaches. Negative for dizziness, tremors, seizures, syncope, facial asymmetry, speech difficulty, weakness, light-headedness and numbness.   Hematological: Negative for adenopathy. Does not bruise/bleed easily.   Psychiatric/Behavioral: Negative for agitation, behavioral problems, confusion, decreased concentration, self-injury, sleep disturbance and suicidal ideas. The patient is not nervous/anxious.                Past Medical History:   Diagnosis Date    Allergic to bees     Migraine      History reviewed. No pertinent surgical history.  Family History   Problem Relation Age of Onset    Diabetes Father     Colon polyps Father     Colon cancer Paternal Grandfather      Social History     Socioeconomic History    Marital status: Single     Spouse name: Not on file    Number of children: Not on file    Years of education: Not on file    Highest education level: Not on file   Social Needs    Financial resource strain: Not on file    Food insecurity - worry: Not on file    Food insecurity - inability: Not on file    Transportation needs - medical: Not on file    Transportation needs - non-medical: Not on file   Occupational History    Not on file   Tobacco Use    Smoking status: Never Smoker    Smokeless tobacco: Never Used   Substance and Sexual Activity    Alcohol use: Yes     Alcohol/week: 0.6 oz     Types: 1 Shots of liquor per week     Comment: once or twice on the weekend    Drug use: No    Sexual  activity: Yes     Partners: Female   Other Topics Concern    Not on file   Social History Narrative    Not on file     Review of patient's allergies indicates:   Allergen Reactions    Apple     Bee pollens     Gluten protein     Nuts [tree nut]     Shellfish containing products     Wheat containing prod        Current Outpatient Medications:     epinephrine (EPIPEN JR) 0.15 mg/0.3 mL pen injection, Inject 0.15 mg into the muscle as needed for Anaphylaxis., Disp: , Rfl:     levocetirizine (XYZAL) 5 MG tablet, Take 5 mg by mouth every evening., Disp: , Rfl:     pseudoephedrine-guaifenesin  mg (MUCINEX D)  mg per tablet, Take 1 tablet by mouth every 12 (twelve) hours., Disp: , Rfl:     rizatriptan (MAXALT) 10 MG tablet, TK 1 T PO PRF HA, Disp: , Rfl: 1    sertraline (ZOLOFT) 50 MG tablet, TAKE 1 TABLET(50 MG) BY MOUTH EVERY DAY, Disp: 30 tablet, Rfl: 14    ZOLMitriptan (ZOMIG-ZMT) 5 MG disintegrating tablet, DIS 1 T ON THE TONGUE AOS OF HEADACHE. MAY REPEAT IN 2 H. NTE 2 TS PER 24 H, Disp: , Rfl: 14    butalbital-acetaminophen-caffeine -40 mg (FIORICET, ESGIC) -40 mg per tablet, Take if Sprix and Zomig ineffective., Disp: 10 tablet, Rfl: 3    galcanezumab-gnlm (EMGALITY) 120 mg/mL PnIj, Inject 120 mg into the skin every 28 days., Disp: 1 mL, Rfl: 5    ketorolac (SPRIX) 15.75 mg/spray Spry, 15.75 mg by Nasal route every 6 (six) hours., Disp: 5 each, Rfl: 5    ZOLMitriptan (ZOMIG) 5 MG tablet, Take 1 at onset of migraine, may repeat in 2 hrs if needed; not to exceed 2 per 24 hrs., Disp: 10 tablet, Rfl: 6      Objective:      Vitals:    04/03/19 1445   BP: 125/80   Pulse: 74   Resp: 16     Body mass index is 29.17 kg/m².    Physical Exam    Constitutional:     He appears well-developed and well-nourished. He is well groomed  HENT:    Head: Atraumatic, oral and nasal mucosa intact  Eyes: Conjunctivae and EOM are normal. Pupils are equal, round, and reactive to light OU  Neck: Neck  supple. No thyromegaly present  Cardiovascular: Normal rate and normal heart sounds  No murmur heard  Pulmonary/Chest: Effort normal and breath sounds normal  Musculoskeletal: Normal range of motion. No joint stiffness. No vertebral point tenderness  Skin: Skin is warm and dry  Psychiatric: Normal mood and affect     Neuro exam:    Mental status:  Awake, attentive, Alert, oriented to self, place, year and month  Language function is intact  Naming, repetition and spontaneous meaningful speech expression are intact  Speech fluency is good and speech is clear  Remote and recent memory are good  Patient able to count backwards by 7    No findings to suggest executive dysfuntion    Patient has adequate insight      Cranial Nerves:  Smell was not formally evaluated  Cranial Nerves II - XII: intact  Pursuits were smooth, normal saccades, no nystagmus OU  Funduscopic exam - disc were flat and pink, no exudates or hemorrhages OU  Motor - facial movement was symmetrical and normal     Palate moved well and was symmetrical with normal palatal and oral sensation  Tongue movements were full    Coordination:     Rapid alternating movements and rapid finger tapping - normal bilaterally  Finger to nose - normal and symmetric bilaterally   Heel to shin test - normal and symmetric bilaterally   Arm roll - smooth and symmetric   No intentional or positional tremor.     Motor:  Normal muscle bulk and symmetry. No fasciculations were noted    No pronator drift  Strength 5/5 bilaterally     Reflexes:  Tendon reflexes were 2 + at biceps, triceps, brachioradialis, patellar, and Achilles bilaterally  On plantar stimulation toes were down going bilaterally  No clonus was noted     Sensory: Intact to light touch, pin prick in all extremities.     Gait: Romberg absent. Normal gait.     Review of Data:  Lab Results   Component Value Date     05/17/2017    K 4.5 05/17/2017    CL 96 05/17/2017    CO2 21 05/17/2017    BUN 21 (H) 05/17/2017     CREATININE 0.94 05/17/2017    GLU 86 05/17/2017    AST 27 05/17/2017    ALT 33 05/17/2017    ALBUMIN 5.0 05/17/2017    PROT 7.3 05/17/2017    BILITOT 0.5 05/17/2017    CHOL 312 (H) 05/17/2017    HDL 51 05/17/2017    LDLCALC 213 (H) 05/17/2017    TRIG 240 (H) 05/17/2017       Lab Results   Component Value Date    WBC 5.2 05/17/2017    HGB 14.7 05/17/2017    HCT 44.2 05/17/2017    MCV 91 05/17/2017     05/17/2017     Lab Results   Component Value Date     12/22/2018    K 4.4 12/22/2018     12/22/2018    CO2 29 12/22/2018    BUN 18 12/22/2018    CREATININE 1.1 12/22/2018    GLU 98 12/22/2018    AST 23 12/22/2018    ALT 30 12/22/2018    ALBUMIN 4.2 12/22/2018    PROT 7.4 12/22/2018    BILITOT 0.6 12/22/2018    CHOL 292 (H) 12/22/2018    HDL 54 12/22/2018    LDLCALC 204.8 (H) 12/22/2018    LDLCALC 213 (H) 05/17/2017    TRIG 166 (H) 12/22/2018       Lab Results   Component Value Date    WBC 5.48 12/22/2018    HGB 14.5 12/22/2018    HCT 42.1 12/22/2018    MCV 86 12/22/2018     12/22/2018       Lab Results   Component Value Date    TSH 2.108 12/22/2018           Assessment and Plan   Chronic Migraine without aura improved on Emgality. Blood work normal except for hyperlipidemia. MRI deferred since headaches significantly improved at this time    Familial hypercholesterolemia, followed by PCP    For acute treatment start with Zomig and use Sprix for rescue    Referral to [facility]. Att: PT: Eval and treat for cervical myofascacial pain syndrome (cervical myofascial release, [place], dry needling, heat, ultrasound, kinesiotape, neuromuscular re-education)      Counseling:  More than 50% of the 25 minute encounter was spent face to face counseling the patient regarding current status and future plan of care as well as side of the medications. All questions were answered to patient's satisfaction            Tashi Munoz M.D  Medical Director, Headache and Facial Pain  [place]  [facility]

## 2019-04-29 ENCOUNTER — TELEPHONE (OUTPATIENT)
Dept: PHARMACY | Facility: CLINIC | Age: 39
End: 2019-04-29

## 2019-05-27 ENCOUNTER — TELEPHONE (OUTPATIENT)
Dept: PHARMACY | Facility: CLINIC | Age: 39
End: 2019-05-27

## 2019-06-27 ENCOUNTER — TELEPHONE (OUTPATIENT)
Dept: PHARMACY | Facility: CLINIC | Age: 39
End: 2019-06-27

## 2019-07-03 ENCOUNTER — OFFICE VISIT (OUTPATIENT)
Dept: NEUROLOGY | Facility: CLINIC | Age: 39
End: 2019-07-03
Payer: COMMERCIAL

## 2019-07-03 VITALS
HEART RATE: 78 BPM | HEIGHT: 69 IN | WEIGHT: 196 LBS | BODY MASS INDEX: 29.03 KG/M2 | SYSTOLIC BLOOD PRESSURE: 116 MMHG | DIASTOLIC BLOOD PRESSURE: 78 MMHG | RESPIRATION RATE: 16 BRPM

## 2019-07-03 DIAGNOSIS — E78.01 FAMILIAL HYPERCHOLESTEROLEMIA: ICD-10-CM

## 2019-07-03 DIAGNOSIS — G43.719 INTRACTABLE CHRONIC MIGRAINE WITHOUT AURA AND WITHOUT STATUS MIGRAINOSUS: Primary | ICD-10-CM

## 2019-07-03 DIAGNOSIS — F41.8 SITUATIONAL ANXIETY: ICD-10-CM

## 2019-07-03 PROCEDURE — 99214 OFFICE O/P EST MOD 30 MIN: CPT | Mod: S$GLB,,, | Performed by: PSYCHIATRY & NEUROLOGY

## 2019-07-03 PROCEDURE — 3008F BODY MASS INDEX DOCD: CPT | Mod: CPTII,S$GLB,, | Performed by: PSYCHIATRY & NEUROLOGY

## 2019-07-03 PROCEDURE — 99214 PR OFFICE/OUTPT VISIT, EST, LEVL IV, 30-39 MIN: ICD-10-PCS | Mod: S$GLB,,, | Performed by: PSYCHIATRY & NEUROLOGY

## 2019-07-03 PROCEDURE — 3008F PR BODY MASS INDEX (BMI) DOCUMENTED: ICD-10-PCS | Mod: CPTII,S$GLB,, | Performed by: PSYCHIATRY & NEUROLOGY

## 2019-07-03 PROCEDURE — 99999 PR PBB SHADOW E&M-EST. PATIENT-LVL III: CPT | Mod: PBBFAC,,, | Performed by: PSYCHIATRY & NEUROLOGY

## 2019-07-03 PROCEDURE — 99999 PR PBB SHADOW E&M-EST. PATIENT-LVL III: ICD-10-PCS | Mod: PBBFAC,,, | Performed by: PSYCHIATRY & NEUROLOGY

## 2019-07-03 RX ORDER — KETOROLAC TROMETHAMINE 15.75 MG/1
15.75 SPRAY, METERED NASAL EVERY 6 HOURS
Qty: 5 EACH | Refills: 5 | Status: SHIPPED | OUTPATIENT
Start: 2019-07-03 | End: 2019-07-03 | Stop reason: SDUPTHER

## 2019-07-03 RX ORDER — KETOROLAC TROMETHAMINE 15.75 MG/1
15.75 SPRAY, METERED NASAL EVERY 6 HOURS
Qty: 5 EACH | Refills: 5 | Status: SHIPPED | OUTPATIENT
Start: 2019-07-03 | End: 2022-09-21

## 2019-07-03 RX ORDER — TIZANIDINE HYDROCHLORIDE 4 MG/1
4 CAPSULE, GELATIN COATED ORAL 2 TIMES DAILY PRN
Qty: 30 CAPSULE | Refills: 6 | Status: SHIPPED | OUTPATIENT
Start: 2019-07-03 | End: 2019-08-02

## 2019-07-05 NOTE — PROGRESS NOTES
Subjective:       Patient ID: Anirudh Stokes is a 38 y.o. male.    Chief Complaint: Headache    INTERVAL HISTORY  7/3/2019  The patient comes for follow up. He is accompanied by his wife who contributes to the history. He has had about 4 migraines over the last 3 months. He also reports occasional tension type headache, about 2 to 3 per month, mild, he takes Excedrin tension and it works, related to stress.    INTERVAL HISTORY 4/3/2019  The patient comes for follow up. He is significantly improved on Emgality for prevention with a noticeable decrease in the frequency and severity of the headaches. For acute attacks he takes Sprix and Zomig, which works particularly well. He has not had the MRI of the brain as he was between insurance companies and unsure of coverage.    HPI   The patient is a pleasant 37 y/o male who comes accompanied by his wife with chief complaint of headache. He has had headaches since he was in [employer]. In time they subsided but they recurred and are now debilitating. He is aware of triggers and avoids them he eats healthy, has lost 25 lbs, he does meditation, stretching, walks and is aware of the importance of lifestyle changes. He has not had an MRI of the brain. He had blood work last year which was significant for hyperlipidemia. Allergy testing reveals that he is allergic to dust, apple, cinnamon, amongst others. He took Topamax for a couple of weeks in the past. He is on Zoloft.  Please see details of headache characteristics below.  Headache questionnaire    1. When did your Headaches start?    Not sure      2. Where are your headaches located?   Not sure      3. Your headache's characteristics:   Throbbing, Pounding, Like a tight band      4. How long does the headache last?   3 Days      5. How often does the headache occur?   weekly      6. Are your headaches preceded or accompanied by other symptoms? yes   If yes, please describe.        7. Does the headache awaken you at night?  yes   If so, how often? Only when its really bad. 3 day castro for example        8. Please gage the word that best describes your headache's intensity:    severe      9. Using a scale of 1 through 10, with 0 = no pain and 10 = the worst pain:   What score is your headache now? 0   What score is your headache at its worst? 9   What score is your headache at its best? 0        10. Possible associated headache symptoms:  [x]  Sensitivity to light  [] Dizziness  [x] Nasal or sinus pressure/ pain   [] Sensitivity to noise  [] Vertigo  [x] Problems with concentration  [x] Sensitivity to smells  [] Ringing in ears  [] Problems with memory    [] Blurred vision  [] Irritability  [x] Problems with task completion   [] Double vision  [] Anger  [x]  Problems with relaxation  [] Loss of appetite  [] Anxiety  [x] Neck tightness, Neck pain  [] Nausea   [] Nasal congestion  [] Vomiting         11. Headache improving factors:  [x] Sleep  [] Heat  [] Darkness  [] Ice  [] Local pressure [] Menses (period)  [x] Massage   [x] Medications:        12. Headache worsening factors:   [] Fatigue [] Sneezing  [x] Changes in Weather  [] Light [] Bending Over [x] Stress  [] Noise [] Ovulation  [] Multiple Sclerosis Flare-Up  [x] Smells  [] Menses  [] Food   [] Coughing [] Alcohol      13. Number of caffeinated drinks per day: 2    14. Number of diet drinks per day:  0      15. Have you seen any other [facility] within the last 3 years?  No  Please Check any Medications Tried for Headache    AED Neuromodulators  MAOIs  Ergot Alkaloids    Acetazolamide (Diamox) [] Phenelzine (Nardil) [] Dihydroergotamine (Migranal) []   Carbamazepine (Tegretol) [] Tranylcypromine (Parnate) [] Ergotamine (Ergomar) []   Gabapentin (Neurontin) [] Antihistamine/Serotonergic  Triptans    Lacosamide (Vimpat) [] Cyproheptadine (Periactin) [] Almotriptan (Axert) []   Lamotrigine (Lamictal) [] Antihypertensives  Eletriptan (Relpax) []   Levatiracetam (Keppra)  [] Atenolol (Tenormin) [] Frovatriptan (Frova) []   Oxcarbazepine (Trileptal) [] Bisoprostol (Zebeta) [] Naratriptan (Amerge) []   Phenobarbital [] Candesartan (Atacand) [] Rizatriptan (Maxalt) [x]   Phenytoin (Dilantin) [] Diltiazem (Cardizem) [] Sumatriptan (Imitrex) [x]   Pregabalin (Lyrica) [] Lisinopril (Prinivil, Zestril) [] Zolmitriptan (Zomig) [x]   Primidone (Mysoline) [] Metoprolol (Toprol) [] Combo Abortives    Tiagabine (Gabatril) [] Nadolol (Corgard) [] Butalbital and Acetaminophen (Bupap) []   Topiramate (Topamax)  (Trokendi) [x] Nicardipine (Cardene) []     Vigabatrin (Sabril) [] Nimodipine (Nimotop) [] Butalbital, Acetaminophen, and caffeine (Fioricet) []   Valproic Acid (Depakote) (Divalproex Sodium) [] Propranolol (Inderal) []     Zonisamide (Zonegran) [] Telmisartan (Micardis) [] Butalbital, Aspirin, and caffeine (Fiorinal) []   Benzodiazepines  Timolol (Blocadren) []     Alprazolam (Xanax) [] Verapamil (Calan, Verelan) [] Butalbital, Caffeine, Acetaminophen, and Codeine (Fioricet with Codeine) []   Diazepam (Valium) [] NSAIDs      Lorazepam (Ativan) [] Acetaminophen (Tylenol) [x]     Clonazepam (Klonopin) [] Acetylsalicylic Acid (Aspirin) [x] Butalbital, Caffeine, Aspirin, and Codeine  (Fiorinal with Codeine) []   Antidepressants  Diclofenac (Cambia) []     Amitriptyline (Elavil) [] Ibuprofen (Motrin) []     Desipramine (Norpramin) [] Indomethacin (Indocin) [] Aspirin, Caffeine, and Acetaminophen (Excedrin) (Goodys) [x]   Doxepin (Sinequan) [] Ketoprofen (Orudis) []     Fluoxetine (Prozac) [] Ketorolac (Toradol) [] Acetaminophen, Dichloralphenazone, and Isometheptene (Midrin) []   Imipramine (Tofranil) [] Naproxen (Anaprox) (Aleve) [x]     Nortriptyline (Pamelor) [] Meclofenamic Acid (Meclomen) []     Venlafaxine (Effexor) [] Meloxicam (Mobic) [] Aspirin, Salicylamide, and Caffeine (BC Powder) [x]       Review of Systems   Constitutional: Negative for activity change, appetite change, chills,  fatigue and fever.   HENT: Negative for congestion, dental problem, ear pain, hearing loss, sinus pressure, tinnitus, trouble swallowing and voice change.    Eyes: Negative for photophobia, pain and visual disturbance.   Respiratory: Negative for cough, chest tightness and shortness of breath.    Cardiovascular: Negative for chest pain, palpitations and leg swelling.   Gastrointestinal: Negative for abdominal pain, blood in stool, constipation, diarrhea, nausea and vomiting.   Endocrine: Negative for cold intolerance and heat intolerance.   Genitourinary: Negative for difficulty urinating, dysuria and urgency.   Musculoskeletal: Negative for arthralgias, back pain, gait problem, myalgias, neck pain and neck stiffness.   Skin: Negative for color change, pallor and rash.   Neurological: Positive for headaches. Negative for dizziness, tremors, seizures, syncope, facial asymmetry, speech difficulty, weakness, light-headedness and numbness.   Hematological: Negative for adenopathy. Does not bruise/bleed easily.   Psychiatric/Behavioral: Negative for agitation, behavioral problems, confusion, decreased concentration, self-injury, sleep disturbance and suicidal ideas. The patient is not nervous/anxious.                Past Medical History:   Diagnosis Date    Allergic to bees     Migraine      History reviewed. No pertinent surgical history.  Family History   Problem Relation Age of Onset    Diabetes Father     Colon polyps Father     Colon cancer Paternal Grandfather      Social History     Socioeconomic History    Marital status: Single     Spouse name: Not on file    Number of children: Not on file    Years of education: Not on file    Highest education level: Not on file   Social Needs    Financial resource strain: Not on file    Food insecurity - worry: Not on file    Food insecurity - inability: Not on file    Transportation needs - medical: Not on file    Transportation needs - non-medical: Not on file    Occupational History    Not on file   Tobacco Use    Smoking status: Never Smoker    Smokeless tobacco: Never Used   Substance and Sexual Activity    Alcohol use: Yes     Alcohol/week: 0.6 oz     Types: 1 Shots of liquor per week     Comment: once or twice on the weekend    Drug use: No    Sexual activity: Yes     Partners: Female   Other Topics Concern    Not on file   Social History Narrative    Not on file     Review of patient's allergies indicates:   Allergen Reactions    Apple     Bee pollens     Gluten protein     Nuts [tree nut]     Shellfish containing products     Wheat containing prod        Current Outpatient Medications:     epinephrine (EPIPEN JR) 0.15 mg/0.3 mL pen injection, Inject 0.15 mg into the muscle as needed for Anaphylaxis., Disp: , Rfl:     levocetirizine (XYZAL) 5 MG tablet, Take 5 mg by mouth every evening., Disp: , Rfl:     pseudoephedrine-guaifenesin  mg (MUCINEX D)  mg per tablet, Take 1 tablet by mouth every 12 (twelve) hours., Disp: , Rfl:     rizatriptan (MAXALT) 10 MG tablet, TK 1 T PO PRF HA, Disp: , Rfl: 1    sertraline (ZOLOFT) 50 MG tablet, TAKE 1 TABLET(50 MG) BY MOUTH EVERY DAY, Disp: 30 tablet, Rfl: 14    ZOLMitriptan (ZOMIG-ZMT) 5 MG disintegrating tablet, DIS 1 T ON THE TONGUE AOS OF HEADACHE. MAY REPEAT IN 2 H. NTE 2 TS PER 24 H, Disp: , Rfl: 14    butalbital-acetaminophen-caffeine -40 mg (FIORICET, ESGIC) -40 mg per tablet, Take if Sprix and Zomig ineffective., Disp: 10 tablet, Rfl: 3    galcanezumab-gnlm (EMGALITY) 120 mg/mL PnIj, Inject 120 mg into the skin every 28 days., Disp: 1 mL, Rfl: 5    ketorolac (SPRIX) 15.75 mg/spray Spry, 15.75 mg by Nasal route every 6 (six) hours., Disp: 5 each, Rfl: 5    ZOLMitriptan (ZOMIG) 5 MG tablet, Take 1 at onset of migraine, may repeat in 2 hrs if needed; not to exceed 2 per 24 hrs., Disp: 10 tablet, Rfl: 6      Objective:      Vitals:    07/03/19 1410   BP: 116/78   Pulse: 78    Resp: 16     Body mass index is 28.94 kg/m².    Physical Exam    Constitutional:     He appears well-developed and well-nourished. He is well groomed  HENT:    Head: Atraumatic, oral and nasal mucosa intact  Eyes: Conjunctivae and EOM are normal. Pupils are equal, round, and reactive to light OU  Neck: Neck supple. No thyromegaly present  Cardiovascular: Normal rate and normal heart sounds  No murmur heard  Pulmonary/Chest: Effort normal and breath sounds normal  Musculoskeletal: Normal range of motion. No joint stiffness. No vertebral point tenderness  Skin: Skin is warm and dry  Psychiatric: Normal mood and affect     Neuro exam:    Mental status:  Awake, attentive, Alert, oriented to self, place, year and month  Language function is intact  Naming, repetition and spontaneous meaningful speech expression are intact  Speech fluency is good and speech is clear  Remote and recent memory are good  Patient able to count backwards by 7    No findings to suggest executive dysfuntion    Patient has adequate insight      Cranial Nerves:  Smell was not formally evaluated  Cranial Nerves II - XII: intact  Pursuits were smooth, normal saccades, no nystagmus OU  Funduscopic exam - disc were flat and pink, no exudates or hemorrhages OU  Motor - facial movement was symmetrical and normal     Palate moved well and was symmetrical with normal palatal and oral sensation  Tongue movements were full    Coordination:     Rapid alternating movements and rapid finger tapping - normal bilaterally  Finger to nose - normal and symmetric bilaterally   Heel to shin test - normal and symmetric bilaterally   Arm roll - smooth and symmetric   No intentional or positional tremor.     Motor:  Normal muscle bulk and symmetry. No fasciculations were noted    No pronator drift  Strength 5/5 bilaterally     Reflexes:  Tendon reflexes were 2 + at biceps, triceps, brachioradialis, patellar, and Achilles bilaterally  On plantar stimulation toes were  down going bilaterally  No clonus was noted     Sensory: Intact to light touch, pin prick in all extremities.     Gait: Romberg absent. Normal gait.     Review of Data:  Lab Results   Component Value Date     05/17/2017    K 4.5 05/17/2017    CL 96 05/17/2017    CO2 21 05/17/2017    BUN 21 (H) 05/17/2017    CREATININE 0.94 05/17/2017    GLU 86 05/17/2017    AST 27 05/17/2017    ALT 33 05/17/2017    ALBUMIN 5.0 05/17/2017    PROT 7.3 05/17/2017    BILITOT 0.5 05/17/2017    CHOL 312 (H) 05/17/2017    HDL 51 05/17/2017    LDLCALC 213 (H) 05/17/2017    TRIG 240 (H) 05/17/2017       Lab Results   Component Value Date    WBC 5.2 05/17/2017    HGB 14.7 05/17/2017    HCT 44.2 05/17/2017    MCV 91 05/17/2017     05/17/2017     Lab Results   Component Value Date     12/22/2018    K 4.4 12/22/2018     12/22/2018    CO2 29 12/22/2018    BUN 18 12/22/2018    CREATININE 1.1 12/22/2018    GLU 98 12/22/2018    AST 23 12/22/2018    ALT 30 12/22/2018    ALBUMIN 4.2 12/22/2018    PROT 7.4 12/22/2018    BILITOT 0.6 12/22/2018    CHOL 292 (H) 12/22/2018    HDL 54 12/22/2018    LDLCALC 204.8 (H) 12/22/2018    LDLCALC 213 (H) 05/17/2017    TRIG 166 (H) 12/22/2018       Lab Results   Component Value Date    WBC 5.48 12/22/2018    HGB 14.5 12/22/2018    HCT 42.1 12/22/2018    MCV 86 12/22/2018     12/22/2018       Lab Results   Component Value Date    TSH 2.108 12/22/2018         Assessment and Plan   Chronic Migraine without aura improved on Emgality. Blood work normal except for hyperlipidemia. MRI deferred since headaches significantly improved at this time    Familial hypercholesterolemia, followed by PCP    For acute treatment continue Zomig and use Sprix for rescue      Counseling:  More than 50% of the 25 minute encounter was spent face to face counseling the patient regarding current status and future plan of care as well as side of the medications. All questions were answered to patient's  satisfaction            Tashi Munoz M.D  Medical Director, Headache and Facial Pain  [facility]

## 2019-07-19 PROBLEM — F32.A DEPRESSION: Status: ACTIVE | Noted: 2019-07-19

## 2019-07-25 ENCOUNTER — TELEPHONE (OUTPATIENT)
Dept: PHARMACY | Facility: CLINIC | Age: 39
End: 2019-07-25

## 2019-07-29 NOTE — TELEPHONE ENCOUNTER
During routine refill call patient reports switching from cyclobenzaprine to tizanidine for muscle spasms related to HA. Advised not to take both at once - patient plans to finish out old Flexeril script before starting Zanaflex. Med rec completed - no changes. No DDIs with Emgality encountered. Patient reports that he is very happy with the Emgality - he is down to 1 migraine per month that lasts no longer than a day. If he feels a migraine coming on he takes rescue meds as needed in this order: Excedrin Migraine (added to chart) - Sprix - Maxalt - Fioricet. Most often excedrin migraine prevents his headaches from developing into migraines at all. Patient also reports that medication administration is straight forward and easy.

## 2019-08-22 ENCOUNTER — TELEPHONE (OUTPATIENT)
Dept: PHARMACY | Facility: CLINIC | Age: 39
End: 2019-08-22

## 2019-08-30 DIAGNOSIS — G43.719 INTRACTABLE CHRONIC MIGRAINE WITHOUT AURA AND WITHOUT STATUS MIGRAINOSUS: Primary | ICD-10-CM

## 2019-08-30 RX ORDER — ZOLMITRIPTAN 5 MG/1
TABLET, ORALLY DISINTEGRATING ORAL
Qty: 10 TABLET | Refills: 2 | Status: SHIPPED | OUTPATIENT
Start: 2019-08-30 | End: 2020-04-01 | Stop reason: SDUPTHER

## 2019-08-30 NOTE — TELEPHONE ENCOUNTER
----- Message from Brittany Valentine sent at 8/30/2019  2:15 PM CDT -----   Type:  RX Refill Request    Who Called:  Pt wife humphrey Thapa Rx:   RX Name and Strength:  Zolmitriptan 5 mg desolvable  How is the patient currently taking it? (ex. 1XDay):   As  needed  Is this a 30 day or 90 day RX:  30  day  Preferred Pharmacy with phone number:   [id number] - [place], [place] - [address] [facility] [address] [address]  [address] [address]  Phone: [phone] Fax: [phone]  Best Call Back Number:  [phone]  Additional Information:    Pt  Is calling to  Get a  Refill  Please  Call to  Speak to the   Pt  Wife humphrey   pcp  Suggested   Dr Pako tran

## 2019-09-20 ENCOUNTER — TELEPHONE (OUTPATIENT)
Dept: PHARMACY | Facility: CLINIC | Age: 39
End: 2019-09-20

## 2019-10-09 DIAGNOSIS — G43.719 INTRACTABLE CHRONIC MIGRAINE WITHOUT AURA AND WITHOUT STATUS MIGRAINOSUS: ICD-10-CM

## 2019-10-09 RX ORDER — BUTALBITAL, ACETAMINOPHEN AND CAFFEINE 50; 325; 40 MG/1; MG/1; MG/1
TABLET ORAL
Qty: 10 TABLET | Refills: 3 | Status: SHIPPED | OUTPATIENT
Start: 2019-10-09 | End: 2022-09-21

## 2019-10-18 ENCOUNTER — TELEPHONE (OUTPATIENT)
Dept: PHARMACY | Facility: CLINIC | Age: 39
End: 2019-10-18

## 2019-11-07 ENCOUNTER — TELEPHONE (OUTPATIENT)
Dept: PHARMACY | Facility: CLINIC | Age: 39
End: 2019-11-07

## 2019-11-07 NOTE — TELEPHONE ENCOUNTER
Call attempt 1 to determine if patient would like Emgality transferred back to [facility] for his next dose. No answer - LVM and sent [employer] message.     Felipe Branch, PharmD  Clinical Pharmacist  [facility]  P: [phone]

## 2019-11-08 ENCOUNTER — TELEPHONE (OUTPATIENT)
Dept: PHARMACY | Facility: CLINIC | Age: 39
End: 2019-11-08

## 2019-11-08 NOTE — TELEPHONE ENCOUNTER
Call attempt 2 to determine if patient would like Emgality transferred back to [facility] for next dose. No answer/LVM

## 2019-11-12 ENCOUNTER — OFFICE VISIT (OUTPATIENT)
Dept: NEUROLOGY | Facility: CLINIC | Age: 39
End: 2019-11-12
Payer: COMMERCIAL

## 2019-11-12 VITALS
DIASTOLIC BLOOD PRESSURE: 82 MMHG | BODY MASS INDEX: 29.68 KG/M2 | HEART RATE: 77 BPM | RESPIRATION RATE: 16 BRPM | HEIGHT: 69 IN | WEIGHT: 200.38 LBS | SYSTOLIC BLOOD PRESSURE: 135 MMHG

## 2019-11-12 DIAGNOSIS — F41.8 SITUATIONAL ANXIETY: ICD-10-CM

## 2019-11-12 DIAGNOSIS — E78.01 FAMILIAL HYPERCHOLESTEROLEMIA: ICD-10-CM

## 2019-11-12 DIAGNOSIS — G43.719 INTRACTABLE CHRONIC MIGRAINE WITHOUT AURA AND WITHOUT STATUS MIGRAINOSUS: Primary | ICD-10-CM

## 2019-11-12 PROCEDURE — 99999 PR PBB SHADOW E&M-EST. PATIENT-LVL III: CPT | Mod: PBBFAC,,, | Performed by: PSYCHIATRY & NEUROLOGY

## 2019-11-12 PROCEDURE — 3008F PR BODY MASS INDEX (BMI) DOCUMENTED: ICD-10-PCS | Mod: CPTII,S$GLB,, | Performed by: PSYCHIATRY & NEUROLOGY

## 2019-11-12 PROCEDURE — 3008F BODY MASS INDEX DOCD: CPT | Mod: CPTII,S$GLB,, | Performed by: PSYCHIATRY & NEUROLOGY

## 2019-11-12 PROCEDURE — 99213 OFFICE O/P EST LOW 20 MIN: CPT | Mod: S$GLB,,, | Performed by: PSYCHIATRY & NEUROLOGY

## 2019-11-12 PROCEDURE — 99999 PR PBB SHADOW E&M-EST. PATIENT-LVL III: ICD-10-PCS | Mod: PBBFAC,,, | Performed by: PSYCHIATRY & NEUROLOGY

## 2019-11-12 PROCEDURE — 99213 PR OFFICE/OUTPT VISIT, EST, LEVL III, 20-29 MIN: ICD-10-PCS | Mod: S$GLB,,, | Performed by: PSYCHIATRY & NEUROLOGY

## 2019-11-12 RX ORDER — FLUTICASONE PROPIONATE 50 MCG
SPRAY, SUSPENSION (ML) NASAL
COMMUNITY
Start: 2019-10-31 | End: 2019-12-30

## 2019-11-12 RX ORDER — TIZANIDINE HYDROCHLORIDE 4 MG/1
CAPSULE, GELATIN COATED ORAL
COMMUNITY
Start: 2019-11-06 | End: 2019-12-30

## 2019-11-12 NOTE — PROGRESS NOTES
"Subjective:       Patient ID: Anirudh Stokes is a 38 y.o. male.    Chief Complaint: Headache  INTERVAL HISTORY  11/12/2019  The patient comes for follow up. He is  Doing quite well. "I am back to normal." He has had a few attacks responsive to acute medications or OTC analgesics quickly. He has undergone allergy testing to identify potential dietary and environmental triggers. Otherwise information below is still accurate and current.    INTERVAL HISTORY  7/3/2019  The patient comes for follow up. He is accompanied by his wife who contributes to the history. He has had about 4 migraines over the last 3 months. He also reports occasional tension type headache, about 2 to 3 per month, mild, he takes Excedrin tension and it works, related to stress.    INTERVAL HISTORY 4/3/2019  The patient comes for follow up. He is significantly improved on Emgality for prevention with a noticeable decrease in the frequency and severity of the headaches. For acute attacks he takes Sprix and Zomig, which works particularly well. He has not had the MRI of the brain as he was between insurance companies and unsure of coverage.    HPI   The patient is a pleasant 39 y/o male who comes accompanied by his wife with chief complaint of headache. He has had headaches since he was in [employer]. In time they subsided but they recurred and are now debilitating. He is aware of triggers and avoids them he eats healthy, has lost 25 lbs, he does meditation, stretching, walks and is aware of the importance of lifestyle changes. He has not had an MRI of the brain. He had blood work last year which was significant for hyperlipidemia. Allergy testing reveals that he is allergic to dust, apple, cinnamon, amongst others. He took Topamax for a couple of weeks in the past. He is on Zoloft.  Please see details of headache characteristics below.  Headache questionnaire    1. When did your Headaches start?    Not sure      2. Where are your headaches " located?   Not sure      3. Your headache's characteristics:   Throbbing, Pounding, Like a tight band      4. How long does the headache last?   3 Days      5. How often does the headache occur?   weekly      6. Are your headaches preceded or accompanied by other symptoms? yes   If yes, please describe.        7. Does the headache awaken you at night? yes   If so, how often? Only when its really bad. 3 day castro for example        8. Please gage the word that best describes your headache's intensity:    severe      9. Using a scale of 1 through 10, with 0 = no pain and 10 = the worst pain:   What score is your headache now? 0   What score is your headache at its worst? 9   What score is your headache at its best? 0        10. Possible associated headache symptoms:  [x]  Sensitivity to light  [] Dizziness  [x] Nasal or sinus pressure/ pain   [] Sensitivity to noise  [] Vertigo  [x] Problems with concentration  [x] Sensitivity to smells  [] Ringing in ears  [] Problems with memory    [] Blurred vision  [] Irritability  [x] Problems with task completion   [] Double vision  [] Anger  [x]  Problems with relaxation  [] Loss of appetite  [] Anxiety  [x] Neck tightness, Neck pain  [] Nausea   [] Nasal congestion  [] Vomiting         11. Headache improving factors:  [x] Sleep  [] Heat  [] Darkness  [] Ice  [] Local pressure [] Menses (period)  [x] Massage   [x] Medications:        12. Headache worsening factors:   [] Fatigue [] Sneezing  [x] Changes in Weather  [] Light [] Bending Over [x] Stress  [] Noise [] Ovulation  [] Multiple Sclerosis Flare-Up  [x] Smells  [] Menses  [] Food   [] Coughing [] Alcohol      13. Number of caffeinated drinks per day: 2    14. Number of diet drinks per day:  0      Please Check any Medications Tried for Headache    AED Neuromodulators  MAOIs  Ergot Alkaloids    Acetazolamide (Diamox) [] Phenelzine (Nardil) [] Dihydroergotamine (Migranal) []   Carbamazepine (Tegretol) [] Tranylcypromine  (Parnate) [] Ergotamine (Ergomar) []   Gabapentin (Neurontin) [] Antihistamine/Serotonergic  Triptans    Lacosamide (Vimpat) [] Cyproheptadine (Periactin) [] Almotriptan (Axert) []   Lamotrigine (Lamictal) [] Antihypertensives  Eletriptan (Relpax) []   Levatiracetam (Keppra) [] Atenolol (Tenormin) [] Frovatriptan (Frova) []   Oxcarbazepine (Trileptal) [] Bisoprostol (Zebeta) [] Naratriptan (Amerge) []   Phenobarbital [] Candesartan (Atacand) [] Rizatriptan (Maxalt) [x]   Phenytoin (Dilantin) [] Diltiazem (Cardizem) [] Sumatriptan (Imitrex) [x]   Pregabalin (Lyrica) [] Lisinopril (Prinivil, Zestril) [] Zolmitriptan (Zomig) [x]   Primidone (Mysoline) [] Metoprolol (Toprol) [] Combo Abortives    Tiagabine (Gabatril) [] Nadolol (Corgard) [] Butalbital and Acetaminophen (Bupap) []   Topiramate (Topamax)  (Trokendi) [x] Nicardipine (Cardene) []     Vigabatrin (Sabril) [] Nimodipine (Nimotop) [] Butalbital, Acetaminophen, and caffeine (Fioricet) []   Valproic Acid (Depakote) (Divalproex Sodium) [] Propranolol (Inderal) []     Zonisamide (Zonegran) [] Telmisartan (Micardis) [] Butalbital, Aspirin, and caffeine (Fiorinal) []   Benzodiazepines  Timolol (Blocadren) []     Alprazolam (Xanax) [] Verapamil (Calan, Verelan) [] Butalbital, Caffeine, Acetaminophen, and Codeine (Fioricet with Codeine) []   Diazepam (Valium) [] NSAIDs      Lorazepam (Ativan) [] Acetaminophen (Tylenol) [x]     Clonazepam (Klonopin) [] Acetylsalicylic Acid (Aspirin) [x] Butalbital, Caffeine, Aspirin, and Codeine  (Fiorinal with Codeine) []   Antidepressants  Diclofenac (Cambia) []     Amitriptyline (Elavil) [] Ibuprofen (Motrin) []     Desipramine (Norpramin) [] Indomethacin (Indocin) [] Aspirin, Caffeine, and Acetaminophen (Excedrin) (Goodys) [x]   Doxepin (Sinequan) [] Ketoprofen (Orudis) []     Fluoxetine (Prozac) [] Ketorolac (Toradol) [] Acetaminophen, Dichloralphenazone, and Isometheptene (Midrin) []   Imipramine (Tofranil) [] Naproxen (Anaprox)  (Aleve) [x]     Nortriptyline (Pamelor) [] Meclofenamic Acid (Meclomen) []     Venlafaxine (Effexor) [] Meloxicam (Mobic) [] Aspirin, Salicylamide, and Caffeine (BC Powder) [x]       Review of Systems   Constitutional: Negative for activity change, appetite change, chills, fatigue and fever.   HENT: Negative for congestion, dental problem, ear pain, hearing loss, sinus pressure, tinnitus, trouble swallowing and voice change.    Eyes: Negative for photophobia, pain and visual disturbance.   Respiratory: Negative for cough, chest tightness and shortness of breath.    Cardiovascular: Negative for chest pain, palpitations and leg swelling.   Gastrointestinal: Negative for abdominal pain, blood in stool, constipation, diarrhea, nausea and vomiting.   Endocrine: Negative for cold intolerance and heat intolerance.   Genitourinary: Negative for difficulty urinating, dysuria and urgency.   Musculoskeletal: Negative for arthralgias, back pain, gait problem, myalgias, neck pain and neck stiffness.   Skin: Negative for color change, pallor and rash.   Neurological: Positive for headaches. Negative for dizziness, tremors, seizures, syncope, facial asymmetry, speech difficulty, weakness, light-headedness and numbness.   Hematological: Negative for adenopathy. Does not bruise/bleed easily.   Psychiatric/Behavioral: Negative for agitation, behavioral problems, confusion, decreased concentration, self-injury, sleep disturbance and suicidal ideas. The patient is not nervous/anxious.                Past Medical History:   Diagnosis Date    Allergic to bees     Migraine      History reviewed. No pertinent surgical history.  Family History   Problem Relation Age of Onset    Diabetes Father     Colon polyps Father     Colon cancer Paternal Grandfather      Social History     Socioeconomic History    Marital status: Single     Spouse name: Not on file    Number of children: Not on file    Years of education: Not on file     Highest education level: Not on file   Social Needs    Financial resource strain: Not on file    Food insecurity - worry: Not on file    Food insecurity - inability: Not on file    Transportation needs - medical: Not on file    Transportation needs - non-medical: Not on file   Occupational History    Not on file   Tobacco Use    Smoking status: Never Smoker    Smokeless tobacco: Never Used   Substance and Sexual Activity    Alcohol use: Yes     Alcohol/week: 0.6 oz     Types: 1 Shots of liquor per week     Comment: once or twice on the weekend    Drug use: No    Sexual activity: Yes     Partners: Female   Other Topics Concern    Not on file   Social History Narrative    Not on file     Review of patient's allergies indicates:   Allergen Reactions    Apple     Bee pollens     Gluten protein     Nuts [tree nut]     Shellfish containing products     Wheat containing prod        Current Outpatient Medications:     epinephrine (EPIPEN JR) 0.15 mg/0.3 mL pen injection, Inject 0.15 mg into the muscle as needed for Anaphylaxis., Disp: , Rfl:     levocetirizine (XYZAL) 5 MG tablet, Take 5 mg by mouth every evening., Disp: , Rfl:     pseudoephedrine-guaifenesin  mg (MUCINEX D)  mg per tablet, Take 1 tablet by mouth every 12 (twelve) hours., Disp: , Rfl:     rizatriptan (MAXALT) 10 MG tablet, TK 1 T PO PRF HA, Disp: , Rfl: 1    sertraline (ZOLOFT) 50 MG tablet, TAKE 1 TABLET(50 MG) BY MOUTH EVERY DAY, Disp: 30 tablet, Rfl: 14    ZOLMitriptan (ZOMIG-ZMT) 5 MG disintegrating tablet, DIS 1 T ON THE TONGUE AOS OF HEADACHE. MAY REPEAT IN 2 H. NTE 2 TS PER 24 H, Disp: , Rfl: 14    butalbital-acetaminophen-caffeine -40 mg (FIORICET, ESGIC) -40 mg per tablet, Take if Sprix and Zomig ineffective., Disp: 10 tablet, Rfl: 3    galcanezumab-gnlm (EMGALITY) 120 mg/mL PnIj, Inject 120 mg into the skin every 28 days., Disp: 1 mL, Rfl: 5    ketorolac (SPRIX) 15.75 mg/spray Spry, 15.75 mg by  Nasal route every 6 (six) hours., Disp: 5 each, Rfl: 5    ZOLMitriptan (ZOMIG) 5 MG tablet, Take 1 at onset of migraine, may repeat in 2 hrs if needed; not to exceed 2 per 24 hrs., Disp: 10 tablet, Rfl: 6      Objective:      Vitals:    11/12/19 1004   BP: 135/82   Pulse: 77   Resp: 16     Body mass index is 29.59 kg/m².    Physical Exam    Constitutional:     He appears well-developed and well-nourished. He is well groomed  HENT:    Head: Atraumatic, oral and nasal mucosa intact  Eyes: Conjunctivae and EOM are normal. Pupils are equal, round, and reactive to light OU  Neck: Neck supple. No thyromegaly present  Cardiovascular: Normal rate and normal heart sounds  No murmur heard  Pulmonary/Chest: Effort normal and breath sounds normal  Musculoskeletal: Normal range of motion. No joint stiffness. No vertebral point tenderness  Skin: Skin is warm and dry  Psychiatric: Normal mood and affect     Neuro exam:    Mental status:  Awake, attentive, Alert, oriented to self, place, year and month  Language function is intact  Naming, repetition and spontaneous meaningful speech expression are intact  Speech fluency is good and speech is clear  Remote and recent memory are good  Patient able to count backwards by 7    No findings to suggest executive dysfuntion    Patient has adequate insight      Cranial Nerves:  Smell was not formally evaluated  Cranial Nerves II - XII: intact  Pursuits were smooth, normal saccades, no nystagmus OU  Funduscopic exam - disc were flat and pink, no exudates or hemorrhages OU  Motor - facial movement was symmetrical and normal     Palate moved well and was symmetrical with normal palatal and oral sensation  Tongue movements were full    Coordination:     Rapid alternating movements and rapid finger tapping - normal bilaterally  Finger to nose - normal and symmetric bilaterally   Heel to shin test - normal and symmetric bilaterally   Arm roll - smooth and symmetric   No intentional or positional  tremor.     Motor:  Normal muscle bulk and symmetry. No fasciculations were noted    No pronator drift  Strength 5/5 bilaterally     Reflexes:  Tendon reflexes were 2 + at biceps, triceps, brachioradialis, patellar, and Achilles bilaterally  On plantar stimulation toes were down going bilaterally  No clonus was noted     Sensory: Intact to light touch, pin prick in all extremities.     Gait: Romberg absent. Normal gait.     Review of Data:  Lab Results   Component Value Date     05/17/2017    K 4.5 05/17/2017    CL 96 05/17/2017    CO2 21 05/17/2017    BUN 21 (H) 05/17/2017    CREATININE 0.94 05/17/2017    GLU 86 05/17/2017    AST 27 05/17/2017    ALT 33 05/17/2017    ALBUMIN 5.0 05/17/2017    PROT 7.3 05/17/2017    BILITOT 0.5 05/17/2017    CHOL 312 (H) 05/17/2017    HDL 51 05/17/2017    LDLCALC 213 (H) 05/17/2017    TRIG 240 (H) 05/17/2017       Lab Results   Component Value Date    WBC 5.2 05/17/2017    HGB 14.7 05/17/2017    HCT 44.2 05/17/2017    MCV 91 05/17/2017     05/17/2017     Lab Results   Component Value Date     12/22/2018    K 4.4 12/22/2018     12/22/2018    CO2 29 12/22/2018    BUN 18 12/22/2018    CREATININE 1.1 12/22/2018    GLU 98 12/22/2018    AST 23 12/22/2018    ALT 30 12/22/2018    ALBUMIN 4.2 12/22/2018    PROT 7.4 12/22/2018    BILITOT 0.6 12/22/2018    CHOL 292 (H) 12/22/2018    HDL 54 12/22/2018    LDLCALC 204.8 (H) 12/22/2018    LDLCALC 213 (H) 05/17/2017    TRIG 166 (H) 12/22/2018       Lab Results   Component Value Date    WBC 5.48 12/22/2018    HGB 14.5 12/22/2018    HCT 42.1 12/22/2018    MCV 86 12/22/2018     12/22/2018       Lab Results   Component Value Date    TSH 2.108 12/22/2018         Assessment and Plan   Chronic Migraine without aura improved on Emgality. Blood work normal except for hyperlipidemia. MRI deferred since headaches significantly improved at this time.    We discussed environmental and dietary triggers. I agree with further  testing    Familial hypercholesterolemia, followed by PCP    For acute treatment continue Zomig and use Sprix for rescue                Tashi Munoz M.D  Medical Director, Headache and Facial Pain  [facility]

## 2019-11-13 ENCOUNTER — TELEPHONE (OUTPATIENT)
Dept: PHARMACY | Facility: CLINIC | Age: 39
End: 2019-11-13

## 2019-11-14 ENCOUNTER — TELEPHONE (OUTPATIENT)
Dept: PHARMACY | Facility: CLINIC | Age: 39
End: 2019-11-14

## 2019-11-14 NOTE — TELEPHONE ENCOUNTER
Call attempt 1  for Emgality refill and clinical follow up -[facility]; [employer] message sent. Copay $0 at 004.

## 2019-11-14 NOTE — TELEPHONE ENCOUNTER
Refill readiness for Emgality confirmed with patient; name/ confirmed; no missed doses; no new medications; no side effects noted; address confirmed for  shipment and  delivery.  Patient states they have 0 doses remaining. Next dose due .    Clinical follow-up conducted for Emgality. Name/ confirmed. no missed doses; no new medications; no side effects noted. Patient understands to report any medication changes to [facility] and provider. All questions answered and addressed to patients satisfaction.          Jimmie Ott, R.Ph., AAHIVP  Clinical Pharmacist, HIV/HCV  [facility]  Phone: [phone]

## 2019-12-13 ENCOUNTER — TELEPHONE (OUTPATIENT)
Dept: PHARMACY | Facility: CLINIC | Age: 39
End: 2019-12-13

## 2019-12-16 NOTE — TELEPHONE ENCOUNTER
Emgality refill. Confirmed two patient identifiers - name + . Patient confirms dosage (Q month). Patient has 0 doses remaining, next dose needed by 19. [facility] to ship out Emgality on 19 to arrive at patients home on 19 via [employer]. Address confirmed (Notes to [employer]: none), $0 Copay, Supplies needed: none, [place] received last month. He reports medication is working well for him, # HAs/migraines drastically down. Patient denies starting any new medications, having any new diagnoses or allergies, side effects, or missing any doses. All questions answered to patients satisfaction. [facility] will continue to reach out to patient monthly for refills. TTN

## 2019-12-30 RX ORDER — TIZANIDINE HYDROCHLORIDE 4 MG/1
CAPSULE, GELATIN COATED ORAL
Qty: 30 CAPSULE | Refills: 6 | Status: SHIPPED | OUTPATIENT
Start: 2019-12-30 | End: 2020-04-21

## 2020-01-17 ENCOUNTER — TELEPHONE (OUTPATIENT)
Dept: NEUROLOGY | Facility: CLINIC | Age: 40
End: 2020-01-17

## 2020-01-17 NOTE — TELEPHONE ENCOUNTER
----- Message from Pushpa Henry sent at 1/17/2020  1:36 PM CST -----  Contact: self  Placed call to pod, patient miss call from your office please call back at [phone] (home)     Case number [id number]

## 2020-01-17 NOTE — TELEPHONE ENCOUNTER
Completed PA for emgality and sent info over to [facility]. Patient verbalized understanding.

## 2020-01-21 ENCOUNTER — TELEPHONE (OUTPATIENT)
Dept: NEUROLOGY | Facility: CLINIC | Age: 40
End: 2020-01-21

## 2020-01-21 NOTE — TELEPHONE ENCOUNTER
Called patient and informed him per the pharmacy he will receive a call in regards to his prescription. Patient verbalized understanding.

## 2020-01-21 NOTE — TELEPHONE ENCOUNTER
----- Message from Clare Strong sent at 1/21/2020  3:56 PM CST -----  Contact: Patient  Type: Needs Medical Advice    Who Called:  Patient  Best Call Back Number: [phone] (home)   Additional Information: The pt is calling in regards to the Rx Emgality and wants to know when will it be sent to the pharm please call him to advise

## 2020-01-24 ENCOUNTER — TELEPHONE (OUTPATIENT)
Dept: PHARMACY | Facility: CLINIC | Age: 40
End: 2020-01-24

## 2020-02-18 ENCOUNTER — TELEPHONE (OUTPATIENT)
Dept: PHARMACY | Facility: CLINIC | Age: 40
End: 2020-02-18

## 2020-03-17 ENCOUNTER — TELEPHONE (OUTPATIENT)
Dept: PHARMACY | Facility: CLINIC | Age: 40
End: 2020-03-17

## 2020-04-01 DIAGNOSIS — G43.719 INTRACTABLE CHRONIC MIGRAINE WITHOUT AURA AND WITHOUT STATUS MIGRAINOSUS: ICD-10-CM

## 2020-04-01 RX ORDER — ZOLMITRIPTAN 5 MG/1
TABLET, ORALLY DISINTEGRATING ORAL
Qty: 10 TABLET | Refills: 2 | Status: SHIPPED | OUTPATIENT
Start: 2020-04-01 | End: 2020-11-06

## 2020-04-14 ENCOUNTER — TELEPHONE (OUTPATIENT)
Dept: PHARMACY | Facility: CLINIC | Age: 40
End: 2020-04-14

## 2020-04-15 ENCOUNTER — PATIENT MESSAGE (OUTPATIENT)
Dept: NEUROLOGY | Facility: CLINIC | Age: 40
End: 2020-04-15

## 2020-04-21 RX ORDER — TIZANIDINE HYDROCHLORIDE 4 MG/1
CAPSULE, GELATIN COATED ORAL
Qty: 30 CAPSULE | Refills: 6 | Status: SHIPPED | OUTPATIENT
Start: 2020-04-21 | End: 2022-09-21

## 2020-05-12 ENCOUNTER — TELEPHONE (OUTPATIENT)
Dept: PHARMACY | Facility: CLINIC | Age: 40
End: 2020-05-12

## 2020-05-18 ENCOUNTER — TELEPHONE (OUTPATIENT)
Dept: PHARMACY | Facility: CLINIC | Age: 40
End: 2020-05-18

## 2020-06-09 ENCOUNTER — TELEPHONE (OUTPATIENT)
Dept: PHARMACY | Facility: CLINIC | Age: 40
End: 2020-06-09

## 2020-07-07 ENCOUNTER — TELEPHONE (OUTPATIENT)
Dept: PHARMACY | Facility: CLINIC | Age: 40
End: 2020-07-07

## 2020-08-04 ENCOUNTER — TELEPHONE (OUTPATIENT)
Dept: PHARMACY | Facility: CLINIC | Age: 40
End: 2020-08-04

## 2020-08-04 DIAGNOSIS — G43.719 INTRACTABLE CHRONIC MIGRAINE WITHOUT AURA AND WITHOUT STATUS MIGRAINOSUS: Primary | ICD-10-CM

## 2020-08-04 NOTE — TELEPHONE ENCOUNTER
refill call attempt for emgality, pt's next injection is on 8/11. Patient needs a new prescription refill to be authorized by the provider. Request has been sent. [facility] will follow up to schedule.  pt reports that all questions are the same and address is the same, just ship when [facility] receives the new refills.

## 2020-08-13 PROBLEM — G47.33 OSA (OBSTRUCTIVE SLEEP APNEA): Chronic | Status: ACTIVE | Noted: 2020-08-13

## 2020-09-02 ENCOUNTER — TELEPHONE (OUTPATIENT)
Dept: PHARMACY | Facility: CLINIC | Age: 40
End: 2020-09-02

## 2020-09-02 NOTE — TELEPHONE ENCOUNTER
FOR DOCUMENTATION ONLY:  Financial Assistance for Emgality approved from 09/02/2020 to 12/31/2021  Source Emgality copay card  BIN: [id number]  PCN: [id number]  Id: [id number]  GRP: [id number]  0 copay max 12 fills or $4500

## 2020-09-28 DIAGNOSIS — M25.562 LEFT KNEE PAIN, UNSPECIFIED CHRONICITY: Primary | ICD-10-CM

## 2020-09-30 ENCOUNTER — HOSPITAL ENCOUNTER (OUTPATIENT)
Dept: RADIOLOGY | Facility: HOSPITAL | Age: 40
Discharge: HOME | End: 2020-09-30
Attending: ORTHOPAEDIC SURGERY
Payer: COMMERCIAL

## 2020-09-30 ENCOUNTER — OFFICE VISIT (OUTPATIENT)
Dept: ORTHOPEDICS | Facility: CLINIC | Age: 40
End: 2020-09-30
Payer: COMMERCIAL

## 2020-09-30 VITALS — HEIGHT: 69 IN | WEIGHT: 197.31 LBS | BODY MASS INDEX: 29.22 KG/M2

## 2020-09-30 DIAGNOSIS — M25.562 LEFT KNEE PAIN, UNSPECIFIED CHRONICITY: Primary | ICD-10-CM

## 2020-09-30 DIAGNOSIS — M25.562 CHRONIC PAIN OF LEFT KNEE: ICD-10-CM

## 2020-09-30 DIAGNOSIS — M25.562 CHRONIC PAIN OF LEFT KNEE: Primary | ICD-10-CM

## 2020-09-30 DIAGNOSIS — G89.29 CHRONIC PAIN OF LEFT KNEE: ICD-10-CM

## 2020-09-30 DIAGNOSIS — M25.562 LEFT KNEE PAIN, UNSPECIFIED CHRONICITY: ICD-10-CM

## 2020-09-30 DIAGNOSIS — G89.29 CHRONIC PAIN OF LEFT KNEE: Primary | ICD-10-CM

## 2020-09-30 PROCEDURE — 73562 XR KNEE ORTHO LEFT: ICD-10-PCS | Mod: 26,LT,, | Performed by: RADIOLOGY

## 2020-09-30 PROCEDURE — 73560 XR KNEE ORTHO LEFT: ICD-10-PCS | Mod: 26,RT,, | Performed by: RADIOLOGY

## 2020-09-30 PROCEDURE — 99243 PR OFFICE CONSULTATION,LEVEL III: ICD-10-PCS | Mod: S$GLB,,, | Performed by: ORTHOPAEDIC SURGERY

## 2020-09-30 PROCEDURE — 73560 X-RAY EXAM OF KNEE 1 OR 2: CPT | Mod: 26,RT,, | Performed by: RADIOLOGY

## 2020-09-30 PROCEDURE — 73562 X-RAY EXAM OF KNEE 3: CPT | Mod: 26,LT,, | Performed by: RADIOLOGY

## 2020-09-30 PROCEDURE — 99999 PR PBB SHADOW E&M-EST. PATIENT-LVL IV: ICD-10-PCS | Mod: PBBFAC,,, | Performed by: ORTHOPAEDIC SURGERY

## 2020-09-30 PROCEDURE — 73560 X-RAY EXAM OF KNEE 1 OR 2: CPT | Mod: TC,PO,RT

## 2020-09-30 PROCEDURE — 99999 PR PBB SHADOW E&M-EST. PATIENT-LVL IV: CPT | Mod: PBBFAC,,, | Performed by: ORTHOPAEDIC SURGERY

## 2020-09-30 PROCEDURE — 99243 OFF/OP CNSLTJ NEW/EST LOW 30: CPT | Mod: S$GLB,,, | Performed by: ORTHOPAEDIC SURGERY

## 2020-09-30 NOTE — PROGRESS NOTES
39 years old complaining of pain in left knee which has had now for about a year's time.  No trauma.  Aching type pain which is 1 on good days, 5 on bad days.  Ice seems to help where as crossing his legs seems to make it worse    Exam shows full motion and strength out instability or signs of infection, some tenderness throughout the knee    X-rays are negative    Assessment:  Synovitis chondrosis of the knee, left    Plan:  Physical therapy, bracing, follow-up as needed    Patient seen as a consult from Josh Dunne, communication via epic    Further History  Aching pain  Worse with activity  Relieved with rest  No other associated symptoms  No other radiation    Further Exam  Alert and oriented  Pleasant  Contralateral limb has appropriate range of motion for age and condition  Contralateral limb has appropriate strength for age and condition  Contralateral limb has appropriate stability  for age and condition  No adenopathy  Pulses are appropriate for current condition  Skin is intact        Chief Complaint    Chief Complaint   Patient presents with    Left Knee - Pain       HPI  Anirudh Stokes is a 39 y.o.  male who presents with       Past Medical History  Past Medical History:   Diagnosis Date    Allergic to bees     Migraine        Past Surgical History  Past Surgical History:   Procedure Laterality Date    COLONOSCOPY N/A 9/2/2020    Procedure: COLONOSCOPY;  Surgeon: Ricco Whitehead MD;  Location: [facility];  Service: Endoscopy;  Laterality: N/A;       Medications  Current Outpatient Medications   Medication Sig    aspirin-acetaminophen-caffeine 250-250-65 mg (EXCEDRIN MIGRAINE) 250-250-65 mg per tablet Take 1 tablet by mouth every 6 (six) hours as needed for Pain.    butalbital-acetaminophen-caffeine -40 mg (FIORICET, ESGIC) -40 mg per tablet Take if Sprix and Zomig ineffective.    desloratadine (CLARINEX) 5 mg tablet Take 1 tablet (5 mg total) by mouth once daily.     epinephrine (EPIPEN JR) 0.15 mg/0.3 mL pen injection Inject 0.15 mg into the muscle as needed for Anaphylaxis.    fluticasone propionate (FLONASE) 50 mcg/actuation nasal spray 1 SPRAY (50 MCG TOTAL) BY EACH NARE ROUTE ONCE DAILY.    galcanezumab-gnlm 120 mg/mL PnIj Inject 120 mg into the skin once monthly.    ketorolac (SPRIX) 15.75 mg/spray Spry 15.75 mg by Nasal route every 6 (six) hours.    rizatriptan (MAXALT-MLT) 10 MG disintegrating tablet Dissolve one in mouth at onset of migraine. May repeat in 2 hours if needed. Not to exceed 2 per 24 hrs.    rosuvastatin (CRESTOR) 10 MG tablet Take 1 tablet (10 mg total) by mouth once daily.    sertraline (ZOLOFT) 50 MG tablet TAKE 1 TABLET BY MOUTH EVERY DAY    tiZANidine 4 mg Cap TAKE 1 CAPSULE BY MOUTH 2 (TWO) TIMES DAILY AS NEEDED.    ZOLMitriptan (ZOMIG-ZMT) 5 MG disintegrating tablet Take at onset of headache, may repeat in 2 hrs. Not to exceed 2 tab per 24 hrs.    galcanezumab-gnlm 120 mg/mL PnIj Inject 120 mg into the skin once monthly.     No current facility-administered medications for this visit.        Allergies  Review of patient's allergies indicates:   Allergen Reactions    Apple     Bee pollens     Gluten protein     Nuts [tree nut]     Shellfish containing products     Wheat containing prod        Family History  Family History   Problem Relation Age of Onset    No Known Problems Mother     Diabetes Father     Colon polyps Father     Colon cancer Paternal Grandfather     Cancer Paternal Grandfather        Social History  Social History     Socioeconomic History    Marital status:      Spouse name: Nancy    Number of children: Not on file    Years of education: Not on file    Highest education level: Not on file   Occupational History    Not on file   Social Needs    Financial resource strain: Not very hard    Food insecurity     Worry: Never true     Inability: Never true    Transportation needs     Medical: No      Non-medical: No   Tobacco Use    Smoking status: Never Smoker    Smokeless tobacco: Never Used   Substance and Sexual Activity    Alcohol use: Yes     Alcohol/week: 1.0 standard drinks     Types: 1 Shots of liquor per week     Frequency: 2-3 times a week     Drinks per session: 1 or 2     Binge frequency: Never     Comment: once or twice on the weekend    Drug use: Never    Sexual activity: Yes     Partners: Female     Birth control/protection: None   Lifestyle    Physical activity     Days per week: 5 days     Minutes per session: 20 min    Stress: Rather much   Relationships    Social connections     Talks on phone: Once a week     Gets together: Once a week     Attends Yazidi service: Not on file     Active member of club or organization: Yes     Attends meetings of clubs or organizations: More than 4 times per year     Relationship status:    Other Topics Concern    Not on file   Social History Narrative    Not on file               Review of Systems     Constitutional: Negative    HENT: Negative  Eyes: Negative  Respiratory: Negative  Cardiovascular: Negative  Musculoskeletal: HPI  Skin: Negative  Neurological: Negative  Hematological: Negative  Endocrine: Negative                 Physical Exam    There were no vitals filed for this visit.  Body mass index is 29.14 kg/m².  Physical Examination:     General appearance -  well appearing, and in no distress  Mental status - awake  Neck - supple  Chest -  symmetric air entry  Heart - normal rate   Abdomen - soft      Assessment     1. Left knee pain, unspecified chronicity    2. Chronic pain of left knee          Plan

## 2020-09-30 NOTE — LETTER
September 30, 2020      KSENIA Dunne Jr., MD  [address]  [address]  [facility] [address]           [facility]  [address] [place] [address]  Phone: [phone]          Patient: Anirudh Stokes   MR Number: [id number]   YOB: 1980   Date of Visit: 9/30/2020       Dear Dr. KSENIA Dunne Jr.:    Thank you for referring Anirudh Stokes to me for evaluation. Attached you will find relevant portions of my assessment and plan of care.    If you have questions, please do not hesitate to call me. I look forward to following Anirudh Stokes along with you.    Sincerely,    Boni Deng MD    Enclosure  CC:  No Recipients    If you would like to receive this communication electronically, please contact [email or web address] or [phone] to request more information on [employer] Link access.    For providers and/or their staff who would like to refer a patient to [facility], please contact us through our one-stop-shop provider referral line, [facility], at [phone].    If you feel you have received this communication in error or would no longer like to receive these types of communications, please e-mail [email or web address]

## 2020-10-01 ENCOUNTER — TELEPHONE (OUTPATIENT)
Dept: PHARMACY | Facility: CLINIC | Age: 40
End: 2020-10-01

## 2020-11-03 ENCOUNTER — TELEPHONE (OUTPATIENT)
Dept: PHARMACY | Facility: CLINIC | Age: 40
End: 2020-11-03

## 2021-01-22 ENCOUNTER — TELEPHONE (OUTPATIENT)
Dept: PHARMACY | Facility: CLINIC | Age: 41
End: 2021-01-22

## 2021-02-26 ENCOUNTER — PATIENT MESSAGE (OUTPATIENT)
Dept: NEUROLOGY | Facility: CLINIC | Age: 41
End: 2021-02-26

## 2021-04-09 ENCOUNTER — PATIENT MESSAGE (OUTPATIENT)
Dept: ADMINISTRATIVE | Facility: CLINIC | Age: 41
End: 2021-04-09

## 2022-01-08 ENCOUNTER — PATIENT MESSAGE (OUTPATIENT)
Dept: NEUROLOGY | Facility: CLINIC | Age: 42
End: 2022-01-08
Payer: COMMERCIAL

## 2022-01-10 DIAGNOSIS — G43.009 MIGRAINE WITHOUT AURA AND WITHOUT STATUS MIGRAINOSUS, NOT INTRACTABLE: Primary | ICD-10-CM

## 2022-01-10 RX ORDER — GALCANEZUMAB 120 MG/ML
120 INJECTION, SOLUTION SUBCUTANEOUS
Qty: 1 ML | Refills: 11 | Status: SHIPPED | OUTPATIENT
Start: 2022-01-10 | End: 2022-12-14 | Stop reason: SDUPTHER

## 2022-01-19 ENCOUNTER — TELEPHONE (OUTPATIENT)
Dept: PHARMACY | Facility: CLINIC | Age: 42
End: 2022-01-19
Payer: COMMERCIAL

## 2022-01-25 ENCOUNTER — PATIENT MESSAGE (OUTPATIENT)
Dept: NEUROLOGY | Facility: CLINIC | Age: 42
End: 2022-01-25
Payer: COMMERCIAL

## 2022-01-26 ENCOUNTER — PATIENT MESSAGE (OUTPATIENT)
Dept: NEUROLOGY | Facility: CLINIC | Age: 42
End: 2022-01-26
Payer: COMMERCIAL

## 2022-02-03 ENCOUNTER — OFFICE VISIT (OUTPATIENT)
Dept: NEUROLOGY | Facility: CLINIC | Age: 42
End: 2022-02-03
Payer: COMMERCIAL

## 2022-02-03 DIAGNOSIS — E78.00 HYPERCHOLESTEROLEMIA: ICD-10-CM

## 2022-02-03 DIAGNOSIS — G47.33 OSA (OBSTRUCTIVE SLEEP APNEA): Chronic | ICD-10-CM

## 2022-02-03 DIAGNOSIS — G43.719 INTRACTABLE CHRONIC MIGRAINE WITHOUT AURA AND WITHOUT STATUS MIGRAINOSUS: ICD-10-CM

## 2022-02-03 DIAGNOSIS — F34.1 DYSTHYMIA: ICD-10-CM

## 2022-02-03 DIAGNOSIS — F41.8 SITUATIONAL ANXIETY: Primary | ICD-10-CM

## 2022-02-03 PROCEDURE — 99214 OFFICE O/P EST MOD 30 MIN: CPT | Mod: 95,,, | Performed by: PSYCHIATRY & NEUROLOGY

## 2022-02-03 PROCEDURE — 99214 PR OFFICE/OUTPT VISIT, EST, LEVL IV, 30-39 MIN: ICD-10-PCS | Mod: 95,,, | Performed by: PSYCHIATRY & NEUROLOGY

## 2022-02-03 RX ORDER — ZOLMITRIPTAN 5 MG/1
TABLET, ORALLY DISINTEGRATING ORAL
Qty: 6 TABLET | Refills: 11 | Status: SHIPPED | OUTPATIENT
Start: 2022-02-03 | End: 2023-03-23 | Stop reason: SDUPTHER

## 2022-02-03 NOTE — PROGRESS NOTES
"Subjective:       Patient ID: Anirudh Stokes is a 41 y.o. male.    Chief Complaint: Headache  The patient location is: Home  The chief complaint leading to consultation is: Migraine  Visit type: Virtual visit with synchronous audio and video  Total time spent with patient: 15 minutes  The patient was informed of the relationship between the physician and patient and notified that he or she may decline to receive medical services by telemedicine and may withdraw from such care at any time.    INTERVAL HISTORY 2//3/2022  The patient presents for virtual follow up. He is back to low frequency episodic migraine. Main triger is stress. He would like to restart Emgality as 4 to 5 days of severe headache pain per month take a tremendous professional and personal toll. Zomig continues to be effective for attacks. Otherwise information below is still accurate and current.      INTERVAL HISTORY  11/12/2019  The patient comes for follow up. He is  Doing quite well. "I am back to normal." He has had a few attacks responsive to acute medications or OTC analgesics quickly. He has undergone allergy testing to identify potential dietary and environmental triggers. Otherwise information below is still accurate and current.    INTERVAL HISTORY  7/3/2019  The patient comes for follow up. He is accompanied by his wife who contributes to the history. He has had about 4 migraines over the last 3 months. He also reports occasional tension type headache, about 2 to 3 per month, mild, he takes Excedrin tension and it works, related to stress.    INTERVAL HISTORY 4/3/2019  The patient comes for follow up. He is significantly improved on Emgality for prevention with a noticeable decrease in the frequency and severity of the headaches. For acute attacks he takes Sprix and Zomig, which works particularly well. He has not had the MRI of the brain as he was between insurance companies and unsure of coverage.    HPI   The patient is a pleasant 37 y/o " male who comes accompanied by his wife with chief complaint of headache. He has had headaches since he was in [employer]. In time they subsided but they recurred and are now debilitating. He is aware of triggers and avoids them he eats healthy, has lost 25 lbs, he does meditation, stretching, walks and is aware of the importance of lifestyle changes. He has not had an MRI of the brain. He had blood work last year which was significant for hyperlipidemia. Allergy testing reveals that he is allergic to dust, apple, cinnamon, amongst others. He took Topamax for a couple of weeks in the past. He is on Zoloft.  Please see details of headache characteristics below.  Headache questionnaire    1. When did your Headaches start?    Not sure      2. Where are your headaches located?   Not sure      3. Your headache's characteristics:   Throbbing, Pounding, Like a tight band      4. How long does the headache last?   3 Days      5. How often does the headache occur?   weekly      6. Are your headaches preceded or accompanied by other symptoms? yes   If yes, please describe.        7. Does the headache awaken you at night? yes   If so, how often? Only when its really bad. 3 day castro for example        8. Please gage the word that best describes your headache's intensity:    severe      9. Using a scale of 1 through 10, with 0 = no pain and 10 = the worst pain:   What score is your headache now? 0   What score is your headache at its worst? 9   What score is your headache at its best? 0        10. Possible associated headache symptoms:  [x]  Sensitivity to light  [] Dizziness  [x] Nasal or sinus pressure/ pain   [] Sensitivity to noise  [] Vertigo  [x] Problems with concentration  [x] Sensitivity to smells  [] Ringing in ears  [] Problems with memory    [] Blurred vision  [] Irritability  [x] Problems with task completion   [] Double vision  [] Anger  [x]  Problems with relaxation  [] Loss of appetite  [] Anxiety  [x] Neck  tightness, Neck pain  [] Nausea   [] Nasal congestion  [] Vomiting         11. Headache improving factors:  [x] Sleep  [] Heat  [] Darkness  [] Ice  [] Local pressure [] Menses (period)  [x] Massage   [x] Medications:        12. Headache worsening factors:   [] Fatigue [] Sneezing  [x] Changes in Weather  [] Light [] Bending Over [x] Stress  [] Noise [] Ovulation  [] Multiple Sclerosis Flare-Up  [x] Smells  [] Menses  [] Food   [] Coughing [] Alcohol      13. Number of caffeinated drinks per day: 2    14. Number of diet drinks per day:  0      Please Check any Medications Tried for Headache    AED Neuromodulators  MAOIs  Ergot Alkaloids    Acetazolamide (Diamox) [] Phenelzine (Nardil) [] Dihydroergotamine (Migranal) []   Carbamazepine (Tegretol) [] Tranylcypromine (Parnate) [] Ergotamine (Ergomar) []   Gabapentin (Neurontin) [] Antihistamine/Serotonergic  Triptans    Lacosamide (Vimpat) [] Cyproheptadine (Periactin) [] Almotriptan (Axert) []   Lamotrigine (Lamictal) [] Antihypertensives  Eletriptan (Relpax) []   Levatiracetam (Keppra) [] Atenolol (Tenormin) [] Frovatriptan (Frova) []   Oxcarbazepine (Trileptal) [] Bisoprostol (Zebeta) [] Naratriptan (Amerge) []   Phenobarbital [] Candesartan (Atacand) [] Rizatriptan (Maxalt) [x]   Phenytoin (Dilantin) [] Diltiazem (Cardizem) [] Sumatriptan (Imitrex) [x]   Pregabalin (Lyrica) [] Lisinopril (Prinivil, Zestril) [] Zolmitriptan (Zomig) [x]   Primidone (Mysoline) [] Metoprolol (Toprol) [] Combo Abortives    Tiagabine (Gabatril) [] Nadolol (Corgard) [] Butalbital and Acetaminophen (Bupap) []   Topiramate (Topamax)  (Trokendi) [x] Nicardipine (Cardene) []     Vigabatrin (Sabril) [] Nimodipine (Nimotop) [] Butalbital, Acetaminophen, and caffeine (Fioricet) []   Valproic Acid (Depakote) (Divalproex Sodium) [] Propranolol (Inderal) []     Zonisamide (Zonegran) [] Telmisartan (Micardis) [] Butalbital, Aspirin, and caffeine (Fiorinal) []   Benzodiazepines  Timolol (Blocadren)  []     Alprazolam (Xanax) [] Verapamil (Calan, Verelan) [] Butalbital, Caffeine, Acetaminophen, and Codeine (Fioricet with Codeine) []   Diazepam (Valium) [] NSAIDs      Lorazepam (Ativan) [] Acetaminophen (Tylenol) [x]     Clonazepam (Klonopin) [] Acetylsalicylic Acid (Aspirin) [x] Butalbital, Caffeine, Aspirin, and Codeine  (Fiorinal with Codeine) []   Antidepressants  Diclofenac (Cambia) []     Amitriptyline (Elavil) [] Ibuprofen (Motrin) []     Desipramine (Norpramin) [] Indomethacin (Indocin) [] Aspirin, Caffeine, and Acetaminophen (Excedrin) (Goodys) [x]   Doxepin (Sinequan) [] Ketoprofen (Orudis) []     Fluoxetine (Prozac) [] Ketorolac (Toradol) [] Acetaminophen, Dichloralphenazone, and Isometheptene (Midrin) []   Imipramine (Tofranil) [] Naproxen (Anaprox) (Aleve) [x]     Nortriptyline (Pamelor) [] Meclofenamic Acid (Meclomen) []     Venlafaxine (Effexor) [] Meloxicam (Mobic) [] Aspirin, Salicylamide, and Caffeine (BC Powder) [x]       Review of Systems   Constitutional: Negative for activity change, appetite change, chills, fatigue and fever.   HENT: Negative for congestion, dental problem, ear pain, hearing loss, sinus pressure, tinnitus, trouble swallowing and voice change.    Eyes: Negative for photophobia, pain and visual disturbance.   Respiratory: Negative for cough, chest tightness and shortness of breath.    Cardiovascular: Negative for chest pain, palpitations and leg swelling.   Gastrointestinal: Negative for abdominal pain, blood in stool, constipation, diarrhea, nausea and vomiting.   Endocrine: Negative for cold intolerance and heat intolerance.   Genitourinary: Negative for difficulty urinating, dysuria and urgency.   Musculoskeletal: Negative for arthralgias, back pain, gait problem, myalgias, neck pain and neck stiffness.   Skin: Negative for color change, pallor and rash.   Neurological: Positive for headaches. Negative for dizziness, tremors, seizures, syncope, facial asymmetry, speech  difficulty, weakness, light-headedness and numbness.   Hematological: Negative for adenopathy. Does not bruise/bleed easily.   Psychiatric/Behavioral: Negative for agitation, behavioral problems, confusion, decreased concentration, self-injury, sleep disturbance and suicidal ideas. The patient is not nervous/anxious.                Past Medical History:   Diagnosis Date    Allergic to bees     Migraine      History reviewed. No pertinent surgical history.  Family History   Problem Relation Age of Onset    Diabetes Father     Colon polyps Father     Colon cancer Paternal Grandfather      Social History     Socioeconomic History    Marital status: Single     Spouse name: Not on file    Number of children: Not on file    Years of education: Not on file    Highest education level: Not on file   Social Needs    Financial resource strain: Not on file    Food insecurity - worry: Not on file    Food insecurity - inability: Not on file    Transportation needs - medical: Not on file    Transportation needs - non-medical: Not on file   Occupational History    Not on file   Tobacco Use    Smoking status: Never Smoker    Smokeless tobacco: Never Used   Substance and Sexual Activity    Alcohol use: Yes     Alcohol/week: 0.6 oz     Types: 1 Shots of liquor per week     Comment: once or twice on the weekend    Drug use: No    Sexual activity: Yes     Partners: Female   Other Topics Concern    Not on file   Social History Narrative    Not on file     Review of patient's allergies indicates:   Allergen Reactions    Apple     Bee pollens     Gluten protein     Nuts [tree nut]     Shellfish containing products     Wheat containing prod        Current Outpatient Medications   Medication Sig    aspirin-acetaminophen-caffeine 250-250-65 mg (EXCEDRIN MIGRAINE) 250-250-65 mg per tablet Take 1 tablet by mouth every 6 (six) hours as needed for Pain.    butalbital-acetaminophen-caffeine -40 mg (FIORICET,  ESGIC) -40 mg per tablet Take if Sprix and Zomig ineffective.    desloratadine (CLARINEX) 5 mg tablet Take 1 tablet (5 mg total) by mouth once daily.    doxycycline (VIBRA-TABS) 100 MG tablet Take 1 tablet (100 mg total) by mouth every 12 (twelve) hours.    EPINEPHrine (EPIPEN JR) 0.15 mg/0.3 mL pen injection Inject 0.3 mLs (0.15 mg total) into the muscle as needed for Anaphylaxis.    EScitalopram oxalate (LEXAPRO) 10 MG tablet Take 1 tablet (10 mg total) by mouth once daily.    fluticasone propionate (FLONASE) 50 mcg/actuation nasal spray 1 SPRAY (50 MCG TOTAL) BY EACH NARE ROUTE ONCE DAILY.    galcanezumab-gnlm (EMGALITY PEN) 120 mg/mL PnIj Inject 1 pen (120 mg) into the skin every 28 days.    ketorolac (SPRIX) 15.75 mg/spray Spry 15.75 mg by Nasal route every 6 (six) hours.    rizatriptan (MAXALT-MLT) 10 MG disintegrating tablet Dissolve one in mouth at onset of migraine. May repeat in 2 hours if needed. Not to exceed 2 per 24 hrs.    tiZANidine 4 mg Cap TAKE 1 CAPSULE BY MOUTH 2 (TWO) TIMES DAILY AS NEEDED.    ZOLMitriptan (ZOMIG-ZMT) 5 MG disintegrating tablet TAKE AT ONSET OF HEADACHE, MAY REPEAT IN 2 HRS. NOT TO EXCEED 2 TAB PER 24 HRS.     No current facility-administered medications for this visit.         Objective:      Neurological Exam:  General: well-developed, well-nourished, no distress  Mental status: Awake and alert  Speech language: No dysarthria or aphasia on conversation  Cranial nerves: Face symmetric  Motor: Moves all extremities well  Coordination: No ataxia. No tremor.     Review of Data:  Lab Results   Component Value Date     11/20/2021    K 4.2 11/20/2021     11/20/2021    CO2 29 11/20/2021    BUN 19 11/20/2021    CREATININE 0.88 11/20/2021     11/20/2021    AST 48 11/20/2021    ALT 70 (H) 11/20/2021    ALBUMIN 4.7 11/20/2021    PROT 7.0 11/20/2021    BILITOT 0.2 11/20/2021    CHOL 231 (H) 11/20/2021    HDL 40 11/20/2021    LDLCALC 121.0 11/20/2021     LDLCALC 213 (H) 05/17/2017    TRIG 350 (H) 11/20/2021       Lab Results   Component Value Date    WBC 4.93 12/30/2021    HGB 13.8 (L) 12/30/2021    HCT 40.0 12/30/2021    MCV 87 12/30/2021     12/30/2021       Lab Results   Component Value Date    TSH 2.108 12/22/2018           Assessment and Plan   Chronic Migraine without aura improved on Emgality. Blood work normal except for hyperlipidemia. MRI deferred since headaches significantly improved at this time.    We discussed environmental and dietary triggers.    Familial hypercholesterolemia, followed by PCP    For acute treatment continue Jose Angel Munoz M.D  Medical Director, Headache and Facial Pain  [facility]

## 2022-02-05 ENCOUNTER — PATIENT MESSAGE (OUTPATIENT)
Dept: NEUROLOGY | Facility: CLINIC | Age: 42
End: 2022-02-05
Payer: COMMERCIAL

## 2022-02-05 DIAGNOSIS — G43.009 MIGRAINE WITHOUT AURA AND WITHOUT STATUS MIGRAINOSUS, NOT INTRACTABLE: ICD-10-CM

## 2022-02-10 ENCOUNTER — PATIENT MESSAGE (OUTPATIENT)
Dept: NEUROLOGY | Facility: CLINIC | Age: 42
End: 2022-02-10
Payer: COMMERCIAL

## 2022-02-15 ENCOUNTER — PATIENT MESSAGE (OUTPATIENT)
Dept: NEUROLOGY | Facility: CLINIC | Age: 42
End: 2022-02-15
Payer: COMMERCIAL

## 2022-02-17 ENCOUNTER — PATIENT MESSAGE (OUTPATIENT)
Dept: NEUROLOGY | Facility: CLINIC | Age: 42
End: 2022-02-17
Payer: COMMERCIAL

## 2022-07-26 ENCOUNTER — PATIENT MESSAGE (OUTPATIENT)
Dept: NEUROLOGY | Facility: CLINIC | Age: 42
End: 2022-07-26
Payer: COMMERCIAL

## 2022-09-17 PROBLEM — F41.8 SITUATIONAL ANXIETY: Chronic | Status: ACTIVE | Noted: 2017-04-18

## 2022-09-17 PROBLEM — F32.A DEPRESSION: Chronic | Status: ACTIVE | Noted: 2019-07-19

## 2022-09-17 PROBLEM — E78.00 HYPERCHOLESTEROLEMIA: Chronic | Status: ACTIVE | Noted: 2017-11-21

## 2022-09-17 PROBLEM — G43.009 MIGRAINE WITHOUT AURA AND WITHOUT STATUS MIGRAINOSUS, NOT INTRACTABLE: Chronic | Status: ACTIVE | Noted: 2017-11-21

## 2022-11-28 ENCOUNTER — PATIENT MESSAGE (OUTPATIENT)
Dept: NEUROLOGY | Facility: CLINIC | Age: 42
End: 2022-11-28
Payer: COMMERCIAL

## 2022-12-14 DIAGNOSIS — G43.009 MIGRAINE WITHOUT AURA AND WITHOUT STATUS MIGRAINOSUS, NOT INTRACTABLE: ICD-10-CM

## 2022-12-14 RX ORDER — GALCANEZUMAB 120 MG/ML
120 INJECTION, SOLUTION SUBCUTANEOUS
Qty: 1 ML | Refills: 11 | Status: SHIPPED | OUTPATIENT
Start: 2022-12-14 | End: 2023-12-10 | Stop reason: SDUPTHER

## 2022-12-20 ENCOUNTER — PATIENT MESSAGE (OUTPATIENT)
Dept: NEUROLOGY | Facility: CLINIC | Age: 42
End: 2022-12-20
Payer: COMMERCIAL

## 2023-02-06 PROBLEM — R29.898 DECREASED GRIP STRENGTH OF RIGHT HAND: Status: ACTIVE | Noted: 2023-02-06

## 2023-02-06 PROBLEM — M79.641 RIGHT HAND PAIN: Status: ACTIVE | Noted: 2023-02-06

## 2023-02-27 ENCOUNTER — TELEPHONE (OUTPATIENT)
Dept: PHARMACY | Facility: CLINIC | Age: 43
End: 2023-02-27
Payer: COMMERCIAL

## 2023-06-05 ENCOUNTER — PATIENT MESSAGE (OUTPATIENT)
Dept: NEUROLOGY | Facility: CLINIC | Age: 43
End: 2023-06-05
Payer: COMMERCIAL

## 2023-10-04 PROBLEM — R41.840 ATTENTION AND CONCENTRATION DEFICIT: Status: ACTIVE | Noted: 2023-10-04

## 2023-10-16 PROBLEM — R29.898 DECREASED GRIP STRENGTH OF RIGHT HAND: Chronic | Status: ACTIVE | Noted: 2023-02-06

## 2023-10-16 PROBLEM — R41.840 ATTENTION AND CONCENTRATION DEFICIT: Chronic | Status: ACTIVE | Noted: 2023-10-04

## 2023-11-27 PROBLEM — R07.2 PRECORDIAL PAIN: Status: ACTIVE | Noted: 2023-11-27

## 2023-12-10 DIAGNOSIS — G43.009 MIGRAINE WITHOUT AURA AND WITHOUT STATUS MIGRAINOSUS, NOT INTRACTABLE: ICD-10-CM

## 2023-12-11 RX ORDER — GALCANEZUMAB 120 MG/ML
120 INJECTION, SOLUTION SUBCUTANEOUS
Qty: 1 ML | Refills: 11 | Status: SHIPPED | OUTPATIENT
Start: 2023-12-11

## 2024-03-26 ENCOUNTER — OFFICE VISIT (OUTPATIENT)
Dept: NEUROLOGY | Facility: CLINIC | Age: 44
End: 2024-03-26
Payer: COMMERCIAL

## 2024-03-26 VITALS
BODY MASS INDEX: 29.18 KG/M2 | DIASTOLIC BLOOD PRESSURE: 82 MMHG | HEART RATE: 66 BPM | TEMPERATURE: 98 F | RESPIRATION RATE: 17 BRPM | HEIGHT: 69 IN | WEIGHT: 197 LBS | SYSTOLIC BLOOD PRESSURE: 128 MMHG

## 2024-03-26 DIAGNOSIS — G43.719 INTRACTABLE CHRONIC MIGRAINE WITHOUT AURA AND WITHOUT STATUS MIGRAINOSUS: ICD-10-CM

## 2024-03-26 PROCEDURE — 99999 PR PBB SHADOW E&M-EST. PATIENT-LVL III: CPT | Mod: PBBFAC,,, | Performed by: PSYCHIATRY & NEUROLOGY

## 2024-03-26 PROCEDURE — 3074F SYST BP LT 130 MM HG: CPT | Mod: CPTII,S$GLB,, | Performed by: PSYCHIATRY & NEUROLOGY

## 2024-03-26 PROCEDURE — 99214 OFFICE O/P EST MOD 30 MIN: CPT | Mod: S$GLB,,, | Performed by: PSYCHIATRY & NEUROLOGY

## 2024-03-26 PROCEDURE — 3079F DIAST BP 80-89 MM HG: CPT | Mod: CPTII,S$GLB,, | Performed by: PSYCHIATRY & NEUROLOGY

## 2024-03-26 PROCEDURE — 3008F BODY MASS INDEX DOCD: CPT | Mod: CPTII,S$GLB,, | Performed by: PSYCHIATRY & NEUROLOGY

## 2024-03-26 PROCEDURE — 1159F MED LIST DOCD IN RCRD: CPT | Mod: CPTII,S$GLB,, | Performed by: PSYCHIATRY & NEUROLOGY

## 2024-03-26 RX ORDER — ZOLMITRIPTAN 5 MG/1
TABLET, ORALLY DISINTEGRATING ORAL
Qty: 6 TABLET | Refills: 11 | Status: SHIPPED | OUTPATIENT
Start: 2024-03-26

## 2024-03-26 NOTE — PROGRESS NOTES
"Subjective:       Patient ID: Anriudh Stokes is a 43 y.o. male.    Chief Complaint: Headache    INTERVAL HISTORY 03/26/2024  Mr. Oleary presents for follow up. Last seen over 2 years ago. He is on Emgality for prevention and Zomig 5 mg ODT for acute attacks. He reports 1 to 4 headaches per month. If he experiences diffuse neck tightness and generalized aching, he takes Aleve, sometimes, that progresses to migraine. This happens gradually, he has enough time to take his Zomig when it escalates to migraine. Overall, he is pleased with current control but would like to discuss new classes of medications available for migraine and how they would fit in his regimen. He is accompanied by his wife who contributes to the history. No change in habitual headche pattern. No red flags.    INTERVAL HISTORY 2//3/2022  The patient presents for virtual follow up. He is back to low frequency episodic migraine. Main triger is stress. He would like to restart Emgality as 4 to 5 days of severe headache pain per month take a tremendous professional and personal toll. Zomig continues to be effective for attacks. Otherwise information below is still accurate and current.      INTERVAL HISTORY  11/12/2019  The patient comes for follow up. He is  Doing quite well. "I am back to normal." He has had a few attacks responsive to acute medications or OTC analgesics quickly. He has undergone allergy testing to identify potential dietary and environmental triggers. Otherwise information below is still accurate and current.    INTERVAL HISTORY  7/3/2019  The patient comes for follow up. He is accompanied by his wife who contributes to the history. He has had about 4 migraines over the last 3 months. He also reports occasional tension type headache, about 2 to 3 per month, mild, he takes Excedrin tension and it works, related to stress.    INTERVAL HISTORY 4/3/2019  The patient comes for follow up. He is significantly improved on Emgality for prevention " with a noticeable decrease in the frequency and severity of the headaches. For acute attacks he takes Sprix and Zomig, which works particularly well. He has not had the MRI of the brain as he was between insurance companies and unsure of coverage.    HPI   The patient is a pleasant 37 y/o male who comes accompanied by his wife with chief complaint of headache. He has had headaches since he was in [employer]. In time they subsided but they recurred and are now debilitating. He is aware of triggers and avoids them he eats healthy, has lost 25 lbs, he does meditation, stretching, walks and is aware of the importance of lifestyle changes. He has not had an MRI of the brain. He had blood work last year which was significant for hyperlipidemia. Allergy testing reveals that he is allergic to dust, apple, cinnamon, amongst others. He took Topamax for a couple of weeks in the past. He is on Zoloft.  Please see details of headache characteristics below.  Headache questionnaire    1. When did your Headaches start?    Not sure      2. Where are your headaches located?   Not sure      3. Your headache's characteristics:   Throbbing, Pounding, Like a tight band      4. How long does the headache last?   3 Days      5. How often does the headache occur?   weekly      6. Are your headaches preceded or accompanied by other symptoms? yes   If yes, please describe.        7. Does the headache awaken you at night? yes   If so, how often? Only when its really bad. 3 day castro for example        8. Please gage the word that best describes your headache's intensity:    severe      9. Using a scale of 1 through 10, with 0 = no pain and 10 = the worst pain:   What score is your headache now? 0   What score is your headache at its worst? 9   What score is your headache at its best? 0        10. Possible associated headache symptoms:  [x]  Sensitivity to light  [] Dizziness  [x] Nasal or sinus pressure/ pain   [] Sensitivity to noise  []  Vertigo  [x] Problems with concentration  [x] Sensitivity to smells  [] Ringing in ears  [] Problems with memory    [] Blurred vision  [] Irritability  [x] Problems with task completion   [] Double vision  [] Anger  [x]  Problems with relaxation  [] Loss of appetite  [] Anxiety  [x] Neck tightness, Neck pain  [] Nausea   [] Nasal congestion  [] Vomiting         11. Headache improving factors:  [x] Sleep  [] Heat  [] Darkness  [] Ice  [] Local pressure [] Menses (period)  [x] Massage   [x] Medications:        12. Headache worsening factors:   [] Fatigue [] Sneezing  [x] Changes in Weather  [] Light [] Bending Over [x] Stress  [] Noise [] Ovulation  [] Multiple Sclerosis Flare-Up  [x] Smells  [] Menses  [] Food   [] Coughing [] Alcohol      13. Number of caffeinated drinks per day: 2    14. Number of diet drinks per day:  0      Please Check any Medications Tried for Headache    AED Neuromodulators  MAOIs  Ergot Alkaloids    Acetazolamide (Diamox) [] Phenelzine (Nardil) [] Dihydroergotamine (Migranal) []   Carbamazepine (Tegretol) [] Tranylcypromine (Parnate) [] Ergotamine (Ergomar) []   Gabapentin (Neurontin) [] Antihistamine/Serotonergic  Triptans    Lacosamide (Vimpat) [] Cyproheptadine (Periactin) [] Almotriptan (Axert) []   Lamotrigine (Lamictal) [] Antihypertensives  Eletriptan (Relpax) []   Levatiracetam (Keppra) [] Atenolol (Tenormin) [] Frovatriptan (Frova) []   Oxcarbazepine (Trileptal) [] Bisoprostol (Zebeta) [] Naratriptan (Amerge) []   Phenobarbital [] Candesartan (Atacand) [] Rizatriptan (Maxalt) [x]   Phenytoin (Dilantin) [] Diltiazem (Cardizem) [] Sumatriptan (Imitrex) [x]   Pregabalin (Lyrica) [] Lisinopril (Prinivil, Zestril) [] Zolmitriptan (Zomig) [x]   Primidone (Mysoline) [] Metoprolol (Toprol) [] Combo Abortives    Tiagabine (Gabatril) [] Nadolol (Corgard) [] Butalbital and Acetaminophen (Bupap) []   Topiramate (Topamax)  (Trokendi) [x] Nicardipine (Cardene) []     Vigabatrin (Sabril) []  Nimodipine (Nimotop) [] Butalbital, Acetaminophen, and caffeine (Fioricet) []   Valproic Acid (Depakote) (Divalproex Sodium) [] Propranolol (Inderal) []     Zonisamide (Zonegran) [] Telmisartan (Micardis) [] Butalbital, Aspirin, and caffeine (Fiorinal) []   Benzodiazepines  Timolol (Blocadren) []     Alprazolam (Xanax) [] Verapamil (Calan, Verelan) [] Butalbital, Caffeine, Acetaminophen, and Codeine (Fioricet with Codeine) []   Diazepam (Valium) [] NSAIDs      Lorazepam (Ativan) [] Acetaminophen (Tylenol) [x]     Clonazepam (Klonopin) [] Acetylsalicylic Acid (Aspirin) [x] Butalbital, Caffeine, Aspirin, and Codeine  (Fiorinal with Codeine) []   Antidepressants  Diclofenac (Cambia) []     Amitriptyline (Elavil) [] Ibuprofen (Motrin) []     Desipramine (Norpramin) [] Indomethacin (Indocin) [] Aspirin, Caffeine, and Acetaminophen (Excedrin) (Goodys) [x]   Doxepin (Sinequan) [] Ketoprofen (Orudis) []     Fluoxetine (Prozac) [] Ketorolac (Toradol) [] Acetaminophen, Dichloralphenazone, and Isometheptene (Midrin) []   Imipramine (Tofranil) [] Naproxen (Anaprox) (Aleve) [x]     Nortriptyline (Pamelor) [] Meclofenamic Acid (Meclomen) []     Venlafaxine (Effexor) [] Meloxicam (Mobic) [] Aspirin, Salicylamide, and Caffeine (BC Powder) [x]       Review of Systems   Constitutional: Negative for activity change, appetite change, chills, fatigue and fever.   HENT: Negative for congestion, dental problem, ear pain, hearing loss, sinus pressure, tinnitus, trouble swallowing and voice change.    Eyes: Negative for photophobia, pain and visual disturbance.   Respiratory: Negative for cough, chest tightness and shortness of breath.    Cardiovascular: Negative for chest pain, palpitations and leg swelling.   Gastrointestinal: Negative for abdominal pain, blood in stool, constipation, diarrhea, nausea and vomiting.   Endocrine: Negative for cold intolerance and heat intolerance.   Genitourinary: Negative for difficulty urinating, dysuria  and urgency.   Musculoskeletal: Negative for arthralgias, back pain, gait problem, myalgias, neck pain and neck stiffness.   Skin: Negative for color change, pallor and rash.   Neurological: Positive for headaches. Negative for dizziness, tremors, seizures, syncope, facial asymmetry, speech difficulty, weakness, light-headedness and numbness.   Hematological: Negative for adenopathy. Does not bruise/bleed easily.   Psychiatric/Behavioral: Negative for agitation, behavioral problems, confusion, decreased concentration, self-injury, sleep disturbance and suicidal ideas. The patient is not nervous/anxious.                Past Medical History:   Diagnosis Date    Allergic to bees     Migraine      History reviewed. No pertinent surgical history.  Family History   Problem Relation Age of Onset    Diabetes Father     Colon polyps Father     Colon cancer Paternal Grandfather      Social History     Socioeconomic History    Marital status: Single     Spouse name: Not on file    Number of children: Not on file    Years of education: Not on file    Highest education level: Not on file   Social Needs    Financial resource strain: Not on file    Food insecurity - worry: Not on file    Food insecurity - inability: Not on file    Transportation needs - medical: Not on file    Transportation needs - non-medical: Not on file   Occupational History    Not on file   Tobacco Use    Smoking status: Never Smoker    Smokeless tobacco: Never Used   Substance and Sexual Activity    Alcohol use: Yes     Alcohol/week: 0.6 oz     Types: 1 Shots of liquor per week     Comment: once or twice on the weekend    Drug use: No    Sexual activity: Yes     Partners: Female   Other Topics Concern    Not on file   Social History Narrative    Not on file     Review of patient's allergies indicates:   Allergen Reactions    Apple     Bee pollens     Gluten protein     Nuts [tree nut]     Shellfish containing products     Wheat containing prod         Current Outpatient Medications   Medication Sig    aspirin-acetaminophen-caffeine 250-250-65 mg (EXCEDRIN MIGRAINE) 250-250-65 mg per tablet Take 1 tablet by mouth every 6 (six) hours as needed for Pain.    butalbital-acetaminophen-caffeine -40 mg (FIORICET, ESGIC) -40 mg per tablet Take if Sprix and Zomig ineffective.    desloratadine (CLARINEX) 5 mg tablet Take 1 tablet (5 mg total) by mouth once daily.    doxycycline (VIBRA-TABS) 100 MG tablet Take 1 tablet (100 mg total) by mouth every 12 (twelve) hours.    EPINEPHrine (EPIPEN JR) 0.15 mg/0.3 mL pen injection Inject 0.3 mLs (0.15 mg total) into the muscle as needed for Anaphylaxis.    EScitalopram oxalate (LEXAPRO) 10 MG tablet Take 1 tablet (10 mg total) by mouth once daily.    fluticasone propionate (FLONASE) 50 mcg/actuation nasal spray 1 SPRAY (50 MCG TOTAL) BY EACH NARE ROUTE ONCE DAILY.    galcanezumab-gnlm (EMGALITY PEN) 120 mg/mL PnIj Inject 1 pen (120 mg) into the skin every 28 days.    ketorolac (SPRIX) 15.75 mg/spray Spry 15.75 mg by Nasal route every 6 (six) hours.    rizatriptan (MAXALT-MLT) 10 MG disintegrating tablet Dissolve one in mouth at onset of migraine. May repeat in 2 hours if needed. Not to exceed 2 per 24 hrs.    tiZANidine 4 mg Cap TAKE 1 CAPSULE BY MOUTH 2 (TWO) TIMES DAILY AS NEEDED.    ZOLMitriptan (ZOMIG-ZMT) 5 MG disintegrating tablet TAKE AT ONSET OF HEADACHE, MAY REPEAT IN 2 HRS. NOT TO EXCEED 2 TAB PER 24 HRS.     No current facility-administered medications for this visit.         Objective:      Vitals:    03/26/24 1313   BP: 128/82   Pulse: 66   Resp: 17   Temp: 97.6 °F (36.4 °C)       Neurological Exam:  General: well-developed, well-nourished, no distress  Mental status: Awake and alert  Speech language: No dysarthria or aphasia on conversation  Cranial nerves: Face symmetric  Motor: Moves all extremities well  Coordination: No ataxia. No tremor.     Review of Data:  Lab Results   Component Value Date      03/21/2023    K 4.0 03/21/2023    CL 98 03/21/2023    CO2 26 03/21/2023    BUN 19 03/21/2023    CREATININE 0.97 03/21/2023    GLU 94 03/21/2023    AST 25 03/21/2023    ALT 40 03/21/2023    ALBUMIN 5.2 (H) 03/21/2023    ALBUMIN 4.7 11/20/2021    PROT 7.0 11/20/2021    BILITOT 0.6 03/21/2023    CHOL 206 (H) 03/21/2023    HDL 61 03/21/2023    LDLCALC 128 (H) 03/21/2023    TRIG 93 03/21/2023       Lab Results   Component Value Date    WBC 5.1 03/21/2023    HGB 15.3 03/21/2023    HCT 45.3 03/21/2023    MCV 90 03/21/2023     03/21/2023       Lab Results   Component Value Date    TSH 2.108 12/22/2018               Assessment and Plan   Chronic Migraine without aura improved on Emgality. Blood work normal except for hyperlipidemia. MRI deferred since headaches significantly improved at this time.    Continue Zomig for acute attacks    Trial of Nurtec and Ubrelvy to establish efficacy and substitute, combine, or alternate with Zomig    Familial hypercholesterolemia, followed by PCP    I spent 40 minutes on the day of this encounter preparing, treating, and managing this patient with migraine headaches.    RTC within a year or sooner if necessary          Tashi Munoz M.D  Medical Director, Headache and Facial Pain  [facility]

## 2024-05-06 ENCOUNTER — PATIENT MESSAGE (OUTPATIENT)
Dept: NEUROLOGY | Facility: CLINIC | Age: 44
End: 2024-05-06
Payer: COMMERCIAL

## 2024-05-06 RX ORDER — RIMEGEPANT SULFATE 75 MG/75MG
TABLET, ORALLY DISINTEGRATING ORAL
Qty: 8 TABLET | Refills: 11 | Status: SHIPPED | OUTPATIENT
Start: 2024-05-06

## 2024-10-15 PROBLEM — Z91.030 ALLERGIC TO BEES: Chronic | Status: ACTIVE | Noted: 2024-10-15

## 2024-10-17 RX ORDER — RIMEGEPANT SULFATE 75 MG/75MG
TABLET, ORALLY DISINTEGRATING ORAL
Qty: 8 TABLET | Refills: 11 | Status: SHIPPED | OUTPATIENT
Start: 2024-10-17

## 2024-12-01 DIAGNOSIS — G43.009 MIGRAINE WITHOUT AURA AND WITHOUT STATUS MIGRAINOSUS, NOT INTRACTABLE: ICD-10-CM

## 2024-12-02 RX ORDER — GALCANEZUMAB 120 MG/ML
120 INJECTION, SOLUTION SUBCUTANEOUS
Qty: 1 ML | Refills: 11 | Status: SHIPPED | OUTPATIENT
Start: 2024-12-02

## 2025-03-19 ENCOUNTER — PATIENT MESSAGE (OUTPATIENT)
Dept: PSYCHIATRY | Facility: CLINIC | Age: 45
End: 2025-03-19
Payer: COMMERCIAL

## 2025-03-27 ENCOUNTER — OFFICE VISIT (OUTPATIENT)
Dept: PSYCHIATRY | Facility: CLINIC | Age: 45
End: 2025-03-27
Payer: COMMERCIAL

## 2025-03-27 VITALS
HEIGHT: 69 IN | DIASTOLIC BLOOD PRESSURE: 86 MMHG | BODY MASS INDEX: 29.81 KG/M2 | HEART RATE: 74 BPM | SYSTOLIC BLOOD PRESSURE: 124 MMHG | WEIGHT: 201.25 LBS

## 2025-03-27 DIAGNOSIS — F32.1 CURRENT MODERATE EPISODE OF MAJOR DEPRESSIVE DISORDER WITHOUT PRIOR EPISODE: ICD-10-CM

## 2025-03-27 PROCEDURE — 1160F RVW MEDS BY RX/DR IN RCRD: CPT | Mod: CPTII,S$GLB,, | Performed by: NURSE PRACTITIONER

## 2025-03-27 PROCEDURE — 3079F DIAST BP 80-89 MM HG: CPT | Mod: CPTII,S$GLB,, | Performed by: NURSE PRACTITIONER

## 2025-03-27 PROCEDURE — 99999 PR PBB SHADOW E&M-EST. PATIENT-LVL IV: CPT | Mod: PBBFAC,,, | Performed by: NURSE PRACTITIONER

## 2025-03-27 PROCEDURE — 3074F SYST BP LT 130 MM HG: CPT | Mod: CPTII,S$GLB,, | Performed by: NURSE PRACTITIONER

## 2025-03-27 PROCEDURE — 90792 PSYCH DIAG EVAL W/MED SRVCS: CPT | Mod: S$GLB,,, | Performed by: NURSE PRACTITIONER

## 2025-03-27 PROCEDURE — 1159F MED LIST DOCD IN RCRD: CPT | Mod: CPTII,S$GLB,, | Performed by: NURSE PRACTITIONER

## 2025-03-27 RX ORDER — FLUOXETINE HYDROCHLORIDE 20 MG/1
20 CAPSULE ORAL DAILY
Qty: 30 CAPSULE | Refills: 2 | Status: SHIPPED | OUTPATIENT
Start: 2025-03-27 | End: 2026-03-27

## 2025-03-27 NOTE — PROGRESS NOTES
"Outpatient Psychiatry Initial Visit  03/27/2025    ID:  45 yo M  presenting for an initial evaluation. Met with patient.    Reason for encounter: Referral from PCP    History of Present Illness: 45 yo M with hx of "depression" presenting for initial eval and med mgmt. PMHx detailed below. Currently on Lexapro 10mg QD (x ~2 yrs, reduced this on his own from 20mg QD one week ago), previously on Wellbutrin 75mg QD (started 6-8 mos ago, stopped ~2 wks ago). Past Zoloft trial d/c due to SEs. Sees counselor weekly. Hx of depressive sx since high school. 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 Reports feeling "stuck in my own head" and "I just dont feel like myself anymore." Denies any hx of inpatient psychiatric admissions. Has increased drinking to 1-2 cocktails daily.    Pt currently endorses or denies the following symptoms:  Psych ROS:  Depression: + sadness, sleep varies, + hx of hopelessness and guilt, denies SI/HI today but does note a hx of passive SI, lacking motivation   Anxiety: no panic attacks, no agoraphobia, no social anxiety, + irritability, generalized worry  PTSD: no flashbacks, nightmares, or avoidance of stimuli  Berenice/Psychosis: No manic episodes, no A/V hallucinations  SI - No SI - access to guns? denies    Past Psychiatric History:  Past Psych Hx: First psych contact:   Prior hospitalizations:  Prior suicide attempts or self harm:  Prior diagnosis: see hpi  Prior meds: see hpi  Current meds: see hpi  Prior psychotherapy: ongoing, weekly      Past Medical Hx: Hx of TBI? denies     Hx of seizures? denies  Past Medical History:   Diagnosis Date    Allergic to bees     Migraine     ALLYN (obstructive sleep apnea) 02/07/2022    per dr boyce         Past Surgical Hx:  Past Surgical History:   Procedure Laterality Date    COLONOSCOPY N/A 9/2/2020    Procedure: COLONOSCOPY;  Surgeon: Ricco Whitehead MD;  Location: [facility];  Service: " Endoscopy;  Laterality: N/A;           Family Hx:   Paternal: denies  Maternal: denies  Sister likely depressed        Social Hx: unable to obtain due to time constraints  Childhood:   Marital Status:  Children:  Resides:  Occupation:  Hobbies:  Mandaeism:  Education level:  :   Legal:     Substance Hx:  Tobacco: denies   Alcohol:   Drug use: denies  Caffeine  Rehab:  Prior/current AA?    Review of Symptoms  GENERAL: no weight gain/loss  SKIN: no rashes or lacerations  HEAD: no headahces  EYES: no jaundice, blindness. No exophthalmos  EARS: no dizziness, tinnitus, or hearing loss  NOSE: no changes in smell  Mouth/throat: no dyskinetic movements or obvious goiter  CHEST: no SOB, hyperventilation or cough  CARDIO: no tachycardia, bradycardia, or chest pain  ABDOMEN: no nausea, vomiting, pain, constipation, or diarrhea  URINARY:  no frequency, dysuria, or sexual dysfunction  ENDOCRINE: No polydipsia, polyuria, no cold/hot intolerance  MUSCULOSKELETAL: no joint pain/stiffness  NEUROLOGIC: no weakness or sensory changes, no seizures, no confusion, memory loss, or forgetfulness, no tremor or abnormal movements    Current Evaluation:  Nutritional Screening:  Considering the patient's height and weight, medications, medical history and preferences, should a referral be made to the dietitian? No  Vitals: most recent vitals signs, dated greater than 90 days prior to this appointment, were reviewed  General: age appropriate, well nourished, casually dressed, neatly groomed  MSK: muscle strength/tone : no tremor or abnormal movements. Gait/Station: no ataxic, steady    Suicide Risk Assessment:  Protective factors: age, gender, no prior attempts, no prior hospitalizations, no ongoing substance abuse, no psychosis, , has children, denies SI/intent/plan, seeking treatment, access to treatment, future oriented, good primary support, no access to firearms    Risks:     Patient is a low immediate and long-term risk  "considering risk factors    Psychiatric:  Speech: Normal rate, rhythm, volume. No latency, no pressured speech  Mood/Affect: euthymic, congruent and appropriate   Though Process: organized, logical, linear  Thought Content: no suicidal or homicidal ideation, no A/V hallucinations, delusions or paranoia  Insight: Intact; aware of illness  Judgement: behavior is adequate to circumstances  Orientation: A&O x 4,  Memory: Intact for content of interview, 3/3 immediate, 3/3 after 3 mins. Able to recall recent and remote events.  Language: Grossly intact, no aphasias   Concentration: Spells "world" correctly forward & backwards  Knowledge/Intelligence: appropriate to age and level of education.   Spouse/Partner: Supportive    ASSESSMENT - DIAGNOSIS - GOALS:  Impression:                            Safe for outpatient tx and no acute safety concerns.  Diagnosis/Diagnoses: mood disorder unspecified, JOHANNE    Strengths/Liabilities: Patient accepts feedback & guidance. Patient is motivated for change.     Treatment Goals: Specify outcomes written in observable, behavioral terms  Anxiety: acquire relapse prevention skills, reduce physical symptoms of anxiety, reduce time spent worrying (>30 minutes/day)  Depression: Acquire relapse prevention skills, increasing energy, increasing interest in usual activities, increasing motivation, reducing excessive guilt and reducing fatigue.    Treatment Plan/Recommendations:   Medication Management: The risks and benefits of medication were discussed with the patient.   Meds:    1) START Prozac 20mg QD.  Discussed potential for GI side effects, sexual dysfunction, mood destabilization, headaches    Labs: none  Return to Clinic: 4-6 weeks  Counseling time: 35 mins  Total time: 60 mins    -  Patient given contact # for psychotherapists at [facility] and also instructed they may check with insurance for a list of providers.   -Call to report any worsening of symptoms or problems associated " with medication  - Pt instructed to go to ER if thoughts of harming self or others arise     -Spent 60min face to face with the pt; >50% time spent in counseling   -Supportive therapy and psychoeducation provided  -R/B/SE's of medications discussed with the pt who expresses understanding and chooses to take medications as prescribed.   -Pt instructed to call clinic, 911 or go to nearest emergency room if sxs worsen or pt is in   crisis. The pt expresses understanding.    Jere Maldonado, NP

## 2025-03-31 ENCOUNTER — PATIENT MESSAGE (OUTPATIENT)
Dept: PSYCHIATRY | Facility: CLINIC | Age: 45
End: 2025-03-31
Payer: COMMERCIAL

## 2025-04-02 ENCOUNTER — PATIENT MESSAGE (OUTPATIENT)
Dept: PSYCHIATRY | Facility: CLINIC | Age: 45
End: 2025-04-02
Payer: COMMERCIAL

## 2025-04-15 PROBLEM — R29.898 DECREASED GRIP STRENGTH OF RIGHT HAND: Chronic | Status: RESOLVED | Noted: 2023-02-06 | Resolved: 2025-04-15

## 2025-04-28 ENCOUNTER — OFFICE VISIT (OUTPATIENT)
Dept: PSYCHIATRY | Facility: CLINIC | Age: 45
End: 2025-04-28
Payer: COMMERCIAL

## 2025-04-28 DIAGNOSIS — F32.1 CURRENT MODERATE EPISODE OF MAJOR DEPRESSIVE DISORDER WITHOUT PRIOR EPISODE: ICD-10-CM

## 2025-04-28 PROCEDURE — 99999 PR PBB SHADOW E&M-EST. PATIENT-LVL II: CPT | Mod: PBBFAC,,, | Performed by: PSYCHOLOGIST

## 2025-04-28 PROCEDURE — 90791 PSYCH DIAGNOSTIC EVALUATION: CPT | Mod: S$GLB,,, | Performed by: PSYCHOLOGIST

## 2025-04-28 PROCEDURE — 1159F MED LIST DOCD IN RCRD: CPT | Mod: CPTII,S$GLB,, | Performed by: PSYCHOLOGIST

## 2025-04-28 NOTE — PROGRESS NOTES
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   Patient was seen, examined and chart was reviewed. Patient reviewed and agreed to informed consent and limits of confidentiality.    Current Stressors: , struggling with taking care of his in laws, issues with father in law, financial stress, occupational stress    Psychiatric Symptoms Review    Depression Symptoms: depressed mood, he has been depressed for 18 months, suicidal thoughts, hopeless, "no happy place,"      Anxiety Symptoms: work stress, worry    Berenice Symptoms: Pt denied current or history of related symptoms.    Psychosis Symptoms: Pt denied current or history of related symptoms.    Attention/Concentration Symptoms: Pt denied current or history of related symptoms.    Disordered Eating/Body Image Concerns: Pt denied current or history of related symptoms.    Suicidal Ideation and Risk:     Access to gun: yes    Homicidal/Violent Ideation and Risk: Pt denied current or history of related symptoms.    Prior Mental Health Treatment:    Prior psychotherapy: Yes. He has been going to counseling for one year and it has been helpful.     Prior Psychiatric Hospitalizations:  Pt denied.     Current psychiatric medication: Prozac 20 mg    Family Psychiatric History: Sister has depression and anxiety.     Current Medical Conditions Per Chart Review: Problem List[1]     SOCIAL HISTORY    Relationships and Support Network:  Born and raised in [place]. He also lived in [place]. Moved to [place] in 2015. He moved here to be with his wife Freya who is from here. He was  in 2018.   He has good connection with his parents who live in [place]. He is close to his sister.   The patient is a car . He had issues finding his way in college. He did not know what to study. 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 Bachelors in business.   He was employed by [employer] in [employer] . 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 This business did not go well. He is struggling in business for the past 10 years. He is not sure what direction to go in career wise.     Abuse History:  Physical  Pt denied.  Emotional  Pt denied.  Sexual   Pt denied.    Other trauma: Pt denied.    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 He is not where he wants to be in business. He ruminates on this. He has caregiver burnout and interpersonal problems with his father in law. Financial stress also exacerbates his problems. He has depression and anxiety. Will use ACT to help him improve his mood and functioning.      Diagnosis(es):   Problem List Items Addressed This Visit       Depression (Chronic)        Ability to adhere to plan:  Cooperative    Rationale for employing these interactive techniques: Applicable to diagnosis    Plan      Goal #1: Improve his mood and functioning   Goal #2: Increase defusion with anxious and depressive thoughts.  Goal #3: Improve relationships and work through relationship dynamics  Goal #4: Improve emotion regulation and communication skills.    This author reviewed limits to confidentiality. Pt indicated understanding and denied any questions.    Return to Clinic:   Weekly    -Spent 60 min face to face with the pt; 50 minutes counseling  -Conducted diagnostic interview  -Pt instructed to call clinic, 911 or go to nearest emergency room if sxs worsen or pt is in   crisis. The pt expresses understanding.    Each patient to whom he or she provides medical services by telemedicine is:  (1) informed of the relationship between the physician " and patient and the respective role of any other health care provider with respect to management of the patient; and (2) notified that he or she may decline to receive medical services by telemedicine and may withdraw from such care at any time.         Mental Status Exam      Appearance: unremarkable  Grooming: appropriate to situation  Arousal: alert, awake  Behavior/Cooperation:Cooperative  Speech: normal tone, normal rate, normal pitch, normal volume  Language: appropriate english vocabulary  Mood: depressed  Affect: congruent to mood  Thought Process: Normal and logical  Thought Content: : normal, no suicidality, no homicidality, delusions, or paranoia  Associations: no loose associations noted  Orientation: Grossly intact  Attention Span/Concentration: intact  Insight: good  Judgment: good    General: age appropriate, well nourished, casually dressed, neatly groomed         [1]   Patient Active Problem List  Diagnosis    Migraine    Situational anxiety    Migraine without aura and without status migrainosus, not intractable    Hypercholesterolemia    Depression    ALLYN (obstructive sleep apnea)    Right hand pain    Attention and concentration deficit    Precordial pain    Allergic to bees

## 2025-05-07 ENCOUNTER — OFFICE VISIT (OUTPATIENT)
Dept: PSYCHIATRY | Facility: CLINIC | Age: 45
End: 2025-05-07
Payer: COMMERCIAL

## 2025-05-07 VITALS
HEART RATE: 78 BPM | WEIGHT: 202.5 LBS | DIASTOLIC BLOOD PRESSURE: 80 MMHG | SYSTOLIC BLOOD PRESSURE: 117 MMHG | BODY MASS INDEX: 29.99 KG/M2 | HEIGHT: 69 IN

## 2025-05-07 DIAGNOSIS — F33.1 MODERATE EPISODE OF RECURRENT MAJOR DEPRESSIVE DISORDER: ICD-10-CM

## 2025-05-07 DIAGNOSIS — F41.1 GAD (GENERALIZED ANXIETY DISORDER): Primary | ICD-10-CM

## 2025-05-07 DIAGNOSIS — F33.1 MODERATE EPISODE OF RECURRENT MAJOR DEPRESSIVE DISORDER: Primary | ICD-10-CM

## 2025-05-07 DIAGNOSIS — F41.1 GAD (GENERALIZED ANXIETY DISORDER): ICD-10-CM

## 2025-05-07 PROCEDURE — 99214 OFFICE O/P EST MOD 30 MIN: CPT | Mod: S$GLB,,, | Performed by: NURSE PRACTITIONER

## 2025-05-07 PROCEDURE — 1159F MED LIST DOCD IN RCRD: CPT | Mod: CPTII,S$GLB,, | Performed by: NURSE PRACTITIONER

## 2025-05-07 PROCEDURE — 90833 PSYTX W PT W E/M 30 MIN: CPT | Mod: S$GLB,,, | Performed by: NURSE PRACTITIONER

## 2025-05-07 PROCEDURE — 1159F MED LIST DOCD IN RCRD: CPT | Mod: CPTII,95,, | Performed by: PSYCHOLOGIST

## 2025-05-07 PROCEDURE — 3008F BODY MASS INDEX DOCD: CPT | Mod: CPTII,S$GLB,, | Performed by: NURSE PRACTITIONER

## 2025-05-07 PROCEDURE — 3079F DIAST BP 80-89 MM HG: CPT | Mod: CPTII,S$GLB,, | Performed by: NURSE PRACTITIONER

## 2025-05-07 PROCEDURE — 1160F RVW MEDS BY RX/DR IN RCRD: CPT | Mod: CPTII,S$GLB,, | Performed by: NURSE PRACTITIONER

## 2025-05-07 PROCEDURE — 3074F SYST BP LT 130 MM HG: CPT | Mod: CPTII,S$GLB,, | Performed by: NURSE PRACTITIONER

## 2025-05-07 PROCEDURE — 99999 PR PBB SHADOW E&M-EST. PATIENT-LVL III: CPT | Mod: PBBFAC,,, | Performed by: NURSE PRACTITIONER

## 2025-05-07 PROCEDURE — 90837 PSYTX W PT 60 MINUTES: CPT | Mod: 95,,, | Performed by: PSYCHOLOGIST

## 2025-05-07 NOTE — PROGRESS NOTES
"  Outpatient Psychiatry Telemedicine Therapy Visit  [facility] - PSYCHIATRY  [facility], [place]   05/07/2025  Anirudh Stokes        History of Presenting Illness:      Pt is seen today for a follow-up appointment for depression.    Patient was seen, examined and chart was reviewed. Patient reviewed and agreed to informed consent and limits of confidentiality.    The patient location is: [place]  The chief complaint leading to consultation is: depression, relationship problems, and occupational problems.      Visit type: audiovisual    Face to Face time with patient: 54  60 minutes of total time spent on the encounter, which includes face to face time and non-face to face time preparing to see the patient (eg, review of tests), Obtaining and/or reviewing separately obtained history, Documenting clinical information in the electronic or other health record, Independently interpreting results (not separately reported) and communicating results to the patient/family/caregiver, or Care coordination (not separately reported).     The session started with a review of therapy goal(s).     Interval History and Content of Current Session:  Content of Current Session:  Last session date: 5/7/2025     Problem(s) Discussed:  He feels stuck in indecision about what to do for his career. He told me about his skills and his employment history.   He has been feeling "stuck" in his head and not taking action about what to do for a living. 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 He has been reading about the flow state. Discussed how to live in the flow state using what he learned and applying this state to his business decision. Discussion of being stuck as a mental construct and what keeps him in indecision, which is fear of the future. He has dreams of going into real estate or starting a different business. Will continue this discussion.     Symptoms/Impairment:   Stress, " depression, and calm when he meditates    In-session Interventions:  Deep listening, Psychoeducation on how he feels stuck. Allowed him to see what mental processes he is using to stay in indecision. Psychoeducation on cognitive defusion.     Prognosis:   [x]  Prognosis favorable based on client's progress/motivation to participate   []  Other:       Medical Necessity for 41038 sessions, if applicable: (Longer session justified and medically necessary due to:)  []  Use of technique that take longer to implement (e.g. EMDR techniques/Systematic Desensitization/ DBT Protocol)  [x]  Severity of symptoms  []  More session time needed to process trauma-related emotions  []  More time needed to create and monitor relapse prevention protocols  []  Client has dual diagnosis -- requires extra time to address both diagnoses   []  Need to manage high-conflict interactions of couple/family  []  Extra time needed to monitor crisis issues and safety planning  [x]  Client has extreme difficulty identifying [emotions/motivations], requires more session time to process and gain insights   []  Not applicable  []  Other:        Risk Parameters:  Patient reports no suicidal ideation  Patient reports no homicidal ideation  Patient reports no self-injurious behavior  Patient reports no violent behavior    Diagnostic Impressions    Major Depressive Disorder, Recurrent, Moderate      Treatment Plan:  1. Continue with Short-term therapy with this author     2. Pt instructed to call clinic, 911 or go to nearest emergency room if sxs worsen or pt is in crisis or suicidal. The pt expresses understanding.    Psychotherapy:   Target symptom(s):   Why chosen therapy is appropriate versus another modality: CBT used; relevant to diagnosis, patient responds to this modality  Outcome monitoring methods: self-report, observation  Therapeutic intervention type: ACT  Topics discussed/themes: building skills sets for symptom management and symptom  recognition  The patient's response to the intervention and treatment is: good  The patient's progress toward treatment goals: unchanged     Return to clinic:   Requests weekly sessions on Wednesday. Virtual. Morning or end of the day.     Bubba Saul PsyD     Each patient to whom he or she provides medical services by telemedicine is:  (1) informed of the relationship between the physician and patient and the respective role of any other health care provider with respect to management of the patient; and (2) notified that he or she may decline to receive medical services by telemedicine and may withdraw from such care at any time.

## 2025-05-07 NOTE — PROGRESS NOTES
"Outpatient Psychiatry Follow-Up Visit    Clinical Status of Patient: Outpatient (Ambulatory)  05/07/2025     Chief Complaint: Pt is a 45 yo M who presents today for a follow-up. Met with patient.       Interval History and Content of Current Session:  Interim Events/Subjective Report/Content of Current Session:  follow up appointment.    Pt is a 45 yo M  with past psychiatric hx of mood disorder NOS, JOHANNE  who presents for follow up treatment. Started Prozac 20mg QD last visit.    Between sessions, the patient reports improvement in anxiety, which is now well-managed. They deny experiencing any major exacerbations and report good stress tolerance. The patient does not report excessive or unproductive worrying and denies somatic symptoms of anxiety, such as restlessness, muscle tension, or chest tightness. They also deny excessive irritability and indicate a good ability to manage frustration. The patient reports sleeping well, with good appetite and energy levels. Overall, they are stable and functioning at a high level.    Pt reports that depressive symptoms have remained well-managed since last visit, with no major decompensations noted. Mood is euthymic and they deny anhedonia, hopelessness, or worthlesness. Motivation level is good. Sleep and appetite are stable.     He continues to follow with psychology.     Past Psychiatric hx: 45 yo M with hx of "depression" presenting for initial eval and med mgmt. PMHx detailed below. Currently on Lexapro 10mg QD (x ~2 yrs, reduced this on his own from 20mg QD one week ago), previously on Wellbutrin 75mg QD (started 6-8 mos ago, stopped ~2 wks ago). Past Zoloft trial d/c due to SEs. Sees counselor weekly. Hx of depressive sx since high school. 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 Reports feeling "stuck in my own head" and "I just dont feel like myself anymore." Denies any hx of inpatient psychiatric " admissions. Has increased drinking to 1-2 cocktails daily.      Past Medical hx:   Past Medical History:   Diagnosis Date    Allergic to bees     Decreased  strength of right hand 02/06/2023    Migraine     ALLYN (obstructive sleep apnea) 02/07/2022    per dr boyce              Review of Systems   PSYCHIATRIC: Pertinent items are noted in the narrative.        CONSTITUTIONAL: weight stable        M/S: no pain today         ENT: no allergies noted today        ABD: no n/v/d     Past Medical, Family and Social History: The patient's past medical, family and social history have been reviewed and updated as appropriate within the electronic medical record. See encounter notes.          Risk Parameters:  Patient reports no suicidal ideation  Patient reports no homicidal ideation  Patient reports no self-injurious behavior  Patient reports no violent behavior     Exam (detailed: at least 9 elements; comprehensive: all 15 elements)   Constitutional  Vitals:  Most recent vital signs, dated less than 90 days prior to this appointment, were reviewed.      General:  unremarkable, age appropriate, casual attire, good eye contact, good rapport       Musculoskeletal  Muscle Strength/Tone:  no flaccidity, no tremor    Gait & Station:  normal      Psychiatric                       Speech:  normal tone, normal rate, rhythm, and volume   Mood & Affect:   Euthymic, congruent, appropriate         Thought Process:   Goal directed; Linear    Associations:   intact   Thought Content:   No SI/HI, delusions, or paranoia, no AV/VH   Insight & Judgement:   Good, adequate to circumstances   Orientation:   grossly intact; alert and oriented x 4    Memory:  intact for content of interview    Language:  grossly intact, can repeat    Attention Span  : Grossly intact for content of interview   Fund of Knowledge:   intact and appropriate to age and level of education        Assessment and Diagnosis   Status/Progress: Based on the examination  today, the patient's problem(s) is/are under fair control.  New problems have not been presented today. Comorbidities are not currently complicating management of the primary condition.      Impression:         Pt is a 43 yo M  with past psychiatric hx of mood disorder NOS, JOHANNE  who presents for follow up treatment. Started Prozac 20mg QD last visit.    Between sessions, the patient reports improvement in anxiety, which is now well-managed. They deny experiencing any major exacerbations and report good stress tolerance. The patient does not report excessive or unproductive worrying and denies somatic symptoms of anxiety, such as restlessness, muscle tension, or chest tightness. They also deny excessive irritability and indicate a good ability to manage frustration. The patient reports sleeping well, with good appetite and energy levels. Overall, they are stable and functioning at a high level.    Pt reports that depressive symptoms have remained well-managed since last visit, with no major decompensations noted. Mood is euthymic and they deny anhedonia, hopelessness, or worthlesness. Motivation level is good. Sleep and appetite are stable.     He continues to follow with psychology.       Diagnosis: mood disorder NOS    Intervention/Counseling/Treatment Plan   Medication Management:      1. Cont Prozac 20mg QD      4. Call to report any worsening of symptoms or problems with the medication. Pt instructed to go to ER with thoughts of harming self, others     5. Patient given contact # for psychotherapists at [facility] [place] and also instructed she may check with insurance for list of providers.      6. Labs: none    Psychotherapy:   Target symptoms: anxiety/mood  Why chosen therapy is appropriate versus another modality: relevant to diagnosis, patient responds to this modality  Outcome monitoring methods: self-report, observation, feedback from family   Therapeutic intervention type: supportive  psychotherapy  Topics discussed/themes: building skills sets for symptom management, symptom recognition, nutrition, exercise  The patient's response to the intervention is accepting. The patient's progress toward treatment goals is positive progress.  Duration of intervention: 20 minutes     Return to clinic: 2 mo    -Spent 30min face to face with the pt; >50% time spent in counseling   -Supportive therapy and psychoeducation provided  -R/B/SE's of medications discussed with the pt who expresses understanding and chooses to take medications as prescribed.   -Pt instructed to call clinic, 911 or go to nearest emergency room if sxs worsen or pt is in   crisis. The pt expresses understanding.    Jere Maldonado, NP

## 2025-05-20 ENCOUNTER — OFFICE VISIT (OUTPATIENT)
Dept: PSYCHIATRY | Facility: CLINIC | Age: 45
End: 2025-05-20
Payer: COMMERCIAL

## 2025-05-20 DIAGNOSIS — F33.1 MODERATE EPISODE OF RECURRENT MAJOR DEPRESSIVE DISORDER: ICD-10-CM

## 2025-05-20 DIAGNOSIS — F41.1 GAD (GENERALIZED ANXIETY DISORDER): Primary | ICD-10-CM

## 2025-05-20 PROCEDURE — 90837 PSYTX W PT 60 MINUTES: CPT | Mod: S$GLB,,, | Performed by: PSYCHOLOGIST

## 2025-05-20 PROCEDURE — 99999 PR PBB SHADOW E&M-EST. PATIENT-LVL II: CPT | Mod: PBBFAC,,, | Performed by: PSYCHOLOGIST

## 2025-05-20 PROCEDURE — 1159F MED LIST DOCD IN RCRD: CPT | Mod: CPTII,S$GLB,, | Performed by: PSYCHOLOGIST

## 2025-05-20 NOTE — PROGRESS NOTES
"  Outpatient Psychiatry Therapy Visit  [facility] PSYCHIATRY  [facility], [place]   05/20/2025  Anirudh Stokes        History of Presenting Illness:      The session began with a review of his treatment goals. Patient reviewed and agreed to informed consent and limits of confidentiality.    Face to Face time was 50 minutes    History of Present Illness    CHIEF COMPLAINT:  Patient presents with feelings of being stuck, depression, and relationship difficulties with his wife.    HPI:  Patient reports feeling stuck in various aspects of his life, including his relationship, career, and living situation. He describes a sense of numbness, difficulty in critical thinking or problem-solving, experiencing "foggy days" and being in a "horrible head space" with bad moods. Patient expresses shame and guilt related to his marriage choices.    Patient discusses ongoing challenges in his marriage, particularly regarding communication and shared goals. He describes his wife as having difficulty in planning and future decisions, leading to frustration and a feeling of erosion in his own ability to plan and commit to activities. Patient expresses disappointment that his wife did not respond to his request for help during a recent difficult period.    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 He expresses a desire to find a new purpose in his professional life and has taken various personality and aptitude tests to identify his strengths and potential career paths.    Patient describes complex family dynamics, particularly with his in-laws. He mentions conflicts related to his father-in-law's car collection and the associated responsibilities, which have tarnished his enthusiasm for cars - previously a significant part of his identity.    Patient expresses unhappiness with his current living situation, mentioning " that he and his wife are renting a house that does not meet their needs or provide the right environment for their desired activities. He also mentions dissatisfaction with his income and expresses a desire to improve his financial situation, possibly through exploring other career options like real estate.    FAMILY HISTORY:  His father-in-law is a car  with mobility issues, unable to walk or drive. He and his wife are part-time caregivers of her parents.     LIFESTYLE:  Patient currently works in his insurance business but expresses dissatisfaction with his job. 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 He is considering a career change and has explored options in real estate. Patient is currently renting a house with his wife, but they are unhappy with their living situation and are considering moving. He has a strong interest in cars and car collections. In his youth, he built Lego sets, model cars, and model airplanes. Patient also expresses interest in real estate and construction, particularly energy-efficient homes. He mentions attending car shows and being involved in car-related activities, though he expresses frustration with his current inability to fully engage in this hobby due to financial constraints and family issues.      ROS:  Psychiatric: +anxiety, +depression, -sleep difficulty, +new or worsening mood changes, +irritability, +feelings of guilt          Prognosis:   [x]  Prognosis favorable based on client's progress/motivation to participate   []  Other:       Medical Necessity for 30920 sessions, if applicable: (Longer session justified and medically necessary due to:)  []  Use of technique that take longer to implement (e.g. EMDR techniques/Systematic Desensitization/ DBT Protocol)  [x]  Severity of symptoms  []  More session time needed to process trauma-related emotions  []  More time needed to create and  monitor relapse prevention protocols  []  Client has dual diagnosis -- requires extra time to address both diagnoses   []  Need to manage high-conflict interactions of couple/family  []  Extra time needed to monitor crisis issues and safety planning  [x]  Client has extreme difficulty identifying [emotions/motivations], requires more session time to process and gain insights   []  Not applicable  []  Other:        Risk Parameters:  Patient reports passive and fleeting thoughts of not wanting to exist but no intent or plan to harm himself  Patient reports no homicidal ideation  Patient reports no self-injurious behavior  Patient reports no violent behavior    Diagnostic Impressions:  Diagnosis(es):   MDD, Moderate, Recurrent  JOHANNE    Treatment Plan:  1. Continue with Short-term therapy with this author     Assessment & Plan    IMPRESSION:  - Assessed mental state, noting signs of depression and anxiety.  - Recognized struggle with feeling stuck in various aspects of life, including relationship, career, and personal identity.  - Identified need to address depression as priority to improve overall functioning and decision-making ability.  - Plan to explore goals and intentions using structured approach (SMART goals) to help clarify direction.  - Considered couple's therapy as potential intervention to address relationship challenges.    MAJOR DEPRESSIVE DISORDER:  - Explained physiological nature of depression and its impact on cognitive functioning and decision-making.  - Patient to practice suspending negative thoughts and allowing oneself to dream big when considering future goals.    RELATIONSHIP COUNSELING:  - Discussed concept of patterns in relationships and communication, and potential for change with effort.  - Discuss options for couple's therapy at next appointment.    PERSONAL GROWTH AND DEVELOPMENT:  - Introduced idea of focusing on process of personal growth rather than solely on end goals.  - Patient to  complete SMART goals worksheet to clarify intentions and desires in various life areas.  - Patient to gather and bring in results from personality assessments and surveys previously taken (e.g., DISC profile, [employer] [place] values survey) and email for review before next session.    FOLLOW-UP CARE:  - Follow up weekly for virtual sessions over next month.          2. Pt instructed to call clinic, 911 or go to nearest emergency room if sxs worsen or pt is in crisis or suicidal. The pt expresses understanding.    Psychotherapy:   Target symptom(s):   Why chosen therapy is appropriate versus another modality: CBT used; relevant to diagnosis, patient responds to this modality  Outcome monitoring methods: self-report, observation  Therapeutic intervention type: ACT  Topics discussed/themes: building skills sets for symptom management and symptom recognition  The patient's response to the intervention and treatment is: good  The patient's progress toward treatment goals: unchanged     Return to clinic:   As needed for ongoing care    Bubba Saul PsyD     This note was generated with the assistance of ambient listening technology. Verbal consent was obtained by the patient and accompanying visitor(s) for the recording of patient appointment to facilitate this note. I attest to having reviewed and edited the generated note for accuracy, though some syntax or spelling errors may persist. Please contact the author of this note for any clarification.

## 2025-05-28 ENCOUNTER — OFFICE VISIT (OUTPATIENT)
Dept: PSYCHIATRY | Facility: CLINIC | Age: 45
End: 2025-05-28
Payer: COMMERCIAL

## 2025-05-28 DIAGNOSIS — R41.840 ATTENTION AND CONCENTRATION DEFICIT: Primary | Chronic | ICD-10-CM

## 2025-05-30 NOTE — PROGRESS NOTES
"  Outpatient Psychiatry Telemedicine Therapy Visit  [facility] - PSYCHIATRY  [facility] [place]   05/29/2025  Anirudh Stokes     Pt location is at home in [place].     History of Present Illness    CHIEF COMPLAINT:  Patient presents for follow-up to discuss his mental health, particularly focusing on depression and anxiety, and to continue working on his personal goals and life direction.    HPI:  Patient reports his current depression level as a 5 out of 10, describing himself as feeling "fragile" and that "one bad thing could throw me down the stairs." He mentions handling his mental state well today, suggesting some fluctuation in his symptoms. Patient also indicates higher levels of anxiety or stress compared to depression.    Patient is working on defining his life goals and direction using a framework of four "buckets": business, wellness, family, and relationships. He expresses difficulty in identifying what's important to him and struggles with confidence in his answers. Patient has been using personality tests and profiles to gain a better understanding of himself.    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 He mentions needing to make more money to afford hiring staff, which he believes would improve his work experience.    Patient expresses a desire for stronger relationships, particularly with his parents and sister who live in [place]. He also mentions wanting to have one to three close friends, preferably local, with whom he could confide during difficult times. Currently, he lacks such close connections.    Patient indicates interest in marriage counseling, suggesting some challenges in his relationship with his wife. He expresses a desire for his "marriage to be happy" when discussing his goals for the " "family aspect of his life.    Patient mentions wellness goals including maintaining a healthy BMI, having no prescriptions if possible, and improving his mobility and flexibility.    Patient discusses using AI for journaling and as a form of "free therapy" to help process his thoughts and gain perspective. He also mentions considering Dialectical Behavior Therapy (DBT) as a potential tool for managing his mental health.    TESTS:  Patient completed a PHQ-9 assessment on the current date, scoring 5, which indicates moderate depression. He also took a JOHANNE-7 test on the same day, with a score higher than his depression score, though the specific score was not provided.    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 He is considering selling the business, hiring an , or transitioning to a new career. He expresses frustration with the current state of his business and mentions the need for increased income to afford hiring staff. Patient also envisions potentially owning an automotive restoration shop with a showroom for classic cars, though this is presented more as an ideal scenario than a current reality.    He lives in [place] with his wife, and they are open to the possibility of relocating out of state. Patient has a keen interest in cars, particularly classic cars and automotive restoration. He enjoys building things and tinkering. He also mentions Legos as a potential activity for managing depression.    In terms of social life, the patient has acquaintances and connections across various locations, especially within the car enthusiast community. However, he lacks close friendships where he feels comfortable sharing his struggles. Patient expresses difficulty in being vulnerable with others and tends to present an enthusiastic facade in social interactions.           Visit type: audiovisual    Face to Face time with patient: 51  60 " "minutes of total time spent on the encounter, which includes face to face time and non-face to face time preparing to see the patient (eg, review of tests), Obtaining and/or reviewing separately obtained history, Documenting clinical information in the electronic or other health record, Independently interpreting results (not separately reported) and communicating results to the patient/family/caregiver, or Care coordination (not separately reported).     The session began with a review of the patient's goals for therapy.      Medical Necessity for 91061 sessions, if applicable: (Longer session justified and medically necessary due to:)  []  Use of technique that take longer to implement (e.g. EMDR techniques/Systematic Desensitization/ DBT Protocol)  [x]  Severity of symptoms  []  More session time needed to process trauma-related emotions  []  More time needed to create and monitor relapse prevention protocols  []  Client has dual diagnosis -- requires extra time to address both diagnoses   []  Need to manage high-conflict interactions of couple/family  []  Extra time needed to monitor crisis issues and safety planning  [x]  Client has extreme difficulty identifying [emotions/motivations], requires more session time to process and gain insights   []  Not applicable  []  Other:        Risk Parameters:  Patient reports no suicidal ideation  Patient reports no homicidal ideation  Patient reports no self-injurious behavior  Patient reports no violent behavior    Assessment & Plan    IMPRESSION:  - Assessed current mental state using 0-10 scale for depression and anxiety.  - Discussed goals and vision for the future across 4 key areas: business, family, wellness, and relationships.  - Encouraged focus on creating "big picture" vision before specifics of how to achieve goals.  - Recommend breaking down larger goals into smaller, actionable steps.  - Suggested exploring parallel paths for career options including keeping " "current business, selling it, or pursuing new career.  - Advised taking action and following instincts/inspiration to make progress on goals.  - Explained concept of creating "field manual" or journal to organize thoughts and goals.  - Discussed benefits of journaling, especially when feeling upset or stuck.  - Provided information on Dialectical Behavior Therapy (DBT) as potential helpful approach, recommend looking into DBT skills, potentially using self-directed DBT skills book.    DIAGNOSIS  Major Depressive Disorder, moderate, recurrent  JOHANNE  Attention and concentration deficit    MENTAL HEALTH AND WELLNESS:  - Patient to create "field manual" or structured plan outlining goals in 4 key areas: business, wellness, family, and relationships.  - Patient to take small, concrete actions towards goals, even if unsure of final outcome.  - Patient to use [employer] tools (such as [employer] AI) for journaling and processing thoughts.    RELATIONSHIP COUNSELING:  - Patient to use Psychology Today website to search for couples therapist specializing in marriage counseling.    FOLLOW-UP:  - Follow up next Wednesday at same time.          Pt instructed to call clinic, 911 or go to nearest emergency room if sxs worsen or pt is in crisis or suicidal. The pt expresses understanding.    Psychotherapy:   Target symptom(s):   Why chosen therapy is appropriate versus another modality: CBT used; relevant to diagnosis, patient responds to this modality  Outcome monitoring methods: self-report, observation  Therapeutic intervention type: CBT  Topics discussed/themes: building skills sets for symptom management and symptom recognition  The patient's response to the intervention and treatment is: good  The patient's progress toward treatment goals: good      Each patient to whom he or she provides medical services by telemedicine is:  (1) informed of the relationship between the physician and patient and the respective role of any other health " care provider with respect to management of the patient; and (2) notified that he or she may decline to receive medical services by telemedicine and may withdraw from such care at any time.    This note was generated with the assistance of ambient listening technology. Verbal consent was obtained by the patient and accompanying visitor(s) for the recording of patient appointment to facilitate this note. I attest to having reviewed and edited the generated note for accuracy, though some syntax or spelling errors may persist. Please contact the author of this note for any clarification.      Each patient to whom he or she provides medical services by telemedicine is:  (1) informed of the relationship between the physician and patient and the respective role of any other health care provider with respect to management of the patient; and (2) notified that he or she may decline to receive medical services by telemedicine and may withdraw from such care at any time.          Bubba Saul PsyD  Licensed Clinical Psychologist

## 2025-06-04 ENCOUNTER — OFFICE VISIT (OUTPATIENT)
Dept: PSYCHIATRY | Facility: CLINIC | Age: 45
End: 2025-06-04
Payer: COMMERCIAL

## 2025-06-04 DIAGNOSIS — F41.1 GAD (GENERALIZED ANXIETY DISORDER): ICD-10-CM

## 2025-06-04 DIAGNOSIS — F33.1 MODERATE EPISODE OF RECURRENT MAJOR DEPRESSIVE DISORDER: Primary | ICD-10-CM

## 2025-06-04 PROCEDURE — 90837 PSYTX W PT 60 MINUTES: CPT | Mod: 95,,, | Performed by: PSYCHOLOGIST

## 2025-06-04 PROCEDURE — 1159F MED LIST DOCD IN RCRD: CPT | Mod: CPTII,95,, | Performed by: PSYCHOLOGIST

## 2025-06-11 ENCOUNTER — OFFICE VISIT (OUTPATIENT)
Dept: PSYCHIATRY | Facility: CLINIC | Age: 45
End: 2025-06-11
Payer: COMMERCIAL

## 2025-06-11 DIAGNOSIS — F33.1 MODERATE EPISODE OF RECURRENT MAJOR DEPRESSIVE DISORDER: ICD-10-CM

## 2025-06-11 DIAGNOSIS — F41.1 GAD (GENERALIZED ANXIETY DISORDER): Primary | ICD-10-CM

## 2025-06-11 PROCEDURE — 90837 PSYTX W PT 60 MINUTES: CPT | Mod: 95,,, | Performed by: PSYCHOLOGIST

## 2025-06-11 PROCEDURE — 1159F MED LIST DOCD IN RCRD: CPT | Mod: CPTII,95,, | Performed by: PSYCHOLOGIST

## 2025-06-12 NOTE — PROGRESS NOTES
"  Outpatient Psychiatry Telemedicine Therapy Visit  [facility] PSYCHIATRY  [facility] [place]   06/11/2025  Anirudh Stokes       History of Present Illness    CHIEF COMPLAINT:  Patient presents for ongoing therapy to address depression, relationship issues, and work-related stress.    HPI:  Patient reports experiencing depressive symptoms, including a heavy feeling in his chest, emotional pain, and difficulty processing emotions. He describes a "weight on my chest" and "this heavy weight and pain" that has been present for the last couple of days. Patient mentions feeling mentally weak and struggling to cope with these feelings.    Patient expresses concerns about his marriage, describing a lack of emotional connection and intimacy with his wife. He states they feel more like "roommates not necessarily partners" and mentions there has been no intimacy for months. Patient also reports difficulties in aligning goals and values with his wife, leading to feelings of disconnection and loneliness.    Patient describes significant stress related to caring for his in-laws. He reports taking shifts caring for them, which he finds emotionally draining and thankless. Patient expresses frustration with the lack of appreciation for his efforts and the impact this has on his relationship with his wife and his own well-being.    Patient mentions dissatisfaction with his current job, particularly struggling with insurance-related work. He expresses uncertainty about his career path and feels a lack of fulfillment in his current role.    Patient reports using sleep as a coping mechanism, stating he "can take a lot of naps just to escape and not think about stuff or try to reset." He also mentions attempting deep breathing exercises to calm down when feeling overwhelmed, but finds these strategies only marginally effective.    Patient describes a general sense of unhappiness and lack of [place] in " his life. He reports difficulty finding motivation to engage in activities and feels a constant sense of heaviness that affects his ability to function normally. Patient denies any physical health issues, mentioning that he had previously undergone stress test and heart evaluations which came back normal.    TESTS:  Patient underwent a stress test, and the results indicated that everything is fine.    LIFESTYLE:  Patient has an extensive scouting background, having been an [employer]  and participating in [employer] and [employer] for 12 years. 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 Patient lives with his wife, but describes their relationship as being more like roommates than partners, mentioning a lack of intimacy in their marriage with no intimate moments for months. He is involved in caregiving activities for his in-laws, often taking shifts to help care for them, which he finds demanding and emotionally draining. Despite these challenges, the patient maintains an interest in health and fitness, mentioning that he works out regularly.          The patient location is: [place]  The chief complaint leading to consultation is: depression and anxiety     Visit type: audiovisual  Individual therapy    Face to Face time with patient: 53  60 minutes of total time spent on the encounter, which includes face to face time and non-face to face time preparing to see the patient (eg, review of tests), Obtaining and/or reviewing separately obtained history, Documenting clinical information in the electronic or other health record, Independently interpreting results (not separately reported) and communicating results to the patient/family/caregiver, or Care coordination (not separately reported).     The session began with a review of the patient's goals for therapy.      Medical Necessity for 90377 sessions, if applicable: (Longer  session justified and medically necessary due to:)  []  Use of technique that take longer to implement (e.g. EMDR techniques/Systematic Desensitization/ DBT Protocol)  [x]  Severity of symptoms  []  More session time needed to process trauma-related emotions  []  More time needed to create and monitor relapse prevention protocols  []  Client has dual diagnosis -- requires extra time to address both diagnoses   [x]  Need to manage high-conflict interactions of couple/family  []  Extra time needed to monitor crisis issues and safety planning  []  Client has extreme difficulty identifying [emotions/motivations], requires more session time to process and gain insights   []  Not applicable  []  Other:        Risk Parameters:  Patient reports no suicidal ideation  Patient reports no homicidal ideation  Patient reports no self-injurious behavior  Patient reports no violent behavior    Assessment & Plan    IMPRESSION:  - Presenting with symptoms of depression, likely a natural reaction to challenging life circumstances.  - Identified lack of intimacy and emotional connection in marriage as key contributing factor.  - Recognized caregiver burnout and strain from caring for in-laws.  - Discussed depression as potential defense mechanism or response to difficult circumstances.  - Explained how emotions can carry messages about underlying issues.    RELATIONSHIP DIFFICULTIES:  - Patient to carve out time for fun activities with spouse to reconnect emotionally.  - Consider non-sexual physical intimacy like holding hands or massage to rebuild connection with spouse.  - Referred to marriage therapy to address relationship difficulties.    DIAGNOSIS:  MDD, Recurrent, moderate  JOHANNE    PERSONAL VALUES AND LIFE CHOICES:  - Educated on importance of aligning behavior and life choices with personal values.  - Patient to explore personal values and how they align with current life situation.    FOLLOW-UP CARE:  - Follow up for continued  therapy and monitoring of mood.          Pt instructed to call clinic, 911 or go to nearest emergency room if sxs worsen or pt is in crisis or suicidal. The pt expresses understanding.    Psychotherapy:   Target symptom(s):   Why chosen therapy is appropriate versus another modality: CBT used; relevant to diagnosis, patient responds to this modality  Outcome monitoring methods: self-report, observation  Therapeutic intervention type: ACT  Topics discussed/themes: building skills sets for symptom management and symptom recognition  The patient's response to the intervention and treatment is: good  The patient's progress toward treatment goals: good      Each patient to whom he or she provides medical services by telemedicine is:  (1) informed of the relationship between the physician and patient and the respective role of any other health care provider with respect to management of the patient; and (2) notified that he or she may decline to receive medical services by telemedicine and may withdraw from such care at any time.    This note was generated with the assistance of ambient listening technology. Verbal consent was obtained by the patient and accompanying visitor(s) for the recording of patient appointment to facilitate this note. I attest to having reviewed and edited the generated note for accuracy, though some syntax or spelling errors may persist. Please contact the author of this note for any clarification.     FOLLOW UP:  One week    Bubba Saul PsyD

## 2025-06-18 ENCOUNTER — OFFICE VISIT (OUTPATIENT)
Dept: PSYCHIATRY | Facility: CLINIC | Age: 45
End: 2025-06-18
Payer: COMMERCIAL

## 2025-06-18 DIAGNOSIS — F33.1 MODERATE EPISODE OF RECURRENT MAJOR DEPRESSIVE DISORDER: Primary | ICD-10-CM

## 2025-06-18 DIAGNOSIS — F41.1 GAD (GENERALIZED ANXIETY DISORDER): ICD-10-CM

## 2025-06-18 PROCEDURE — 1159F MED LIST DOCD IN RCRD: CPT | Mod: CPTII,95,, | Performed by: PSYCHOLOGIST

## 2025-06-18 PROCEDURE — 90837 PSYTX W PT 60 MINUTES: CPT | Mod: 95,,, | Performed by: PSYCHOLOGIST

## 2025-06-18 NOTE — PROGRESS NOTES
"  Outpatient Psychiatry Telemedicine Therapy Visit  [facility] PSYCHIATRY  [facility] [place]   06/18/2025  Anirudh Stokes     Individual therapy. Face to face time was 51 minutes.     History of Present Illness    CHIEF COMPLAINT:  Patient presents for a follow-up session to discuss recent improvements in mood, explore personal values, and address ongoing relationship and family challenges.    HPI:  Patient reports a recent improvement in his mood following a meaningful conversation with his wife. He describes experiencing a "lightness and playful demeanor" in the days following this interaction, which occurred after feeling particularly down on Monday morning with feelings of emptiness and compression in his chest.    Patient has been exploring his personal values using an online questionnaire, identifying benevolence (dependability), overall health, and societal security as his top values. He expresses interest in aligning his daily activities with these core values and discusses potential applications in his personal and professional life.    Patient describes a recent vulnerable moment with his wife where he asked for physical comfort when feeling down, leading to a deeper conversation that helped him feel more heard and aligned with his partner. He expresses a desire to maintain this trajectory of open communication in their relationship.    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 He is exploring ideas that align more closely with his interests and skills, such as creating animated maps for automotive events and potentially rebranding his business to incorporate these new directions.    Patient mentions ongoing challenges related to caring for his father-in-law. He expresses a need to establish boundaries and have a " conversation about expectations and responsibilities. Patient and his wife are considering seeking additional help or resources for managing this caregiving situation.    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 He is considering rebranding his business and exploring new business ideas related to map-making and automotive events. Patient lives with his partner, Freya. His hobbies and interests include automotive events, car shows, and map-making. He mentions building valve cover racers and organizing events. Patient also expresses enthusiasm for creating animated maps for automotive events and shows. He is involved in community events such as block parties in [place] and shows interest in contributing to the community by setting up activities like valve cover racing at these events. 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          The patient location is: [place]  The chief complaint leading to consultation is: No chief complaint on file.       Visit type: audiovisual    Face to Face time with patient: 51  60 minutes of total time spent on the encounter, which includes face to face time and non-face to face time preparing to see the patient (eg, review of tests), Obtaining and/or reviewing separately obtained history, Documenting clinical information in the electronic or other health record, Independently interpreting results (not separately reported) and communicating results to the patient/family/caregiver, or Care coordination (not separately reported).     The session began with a review of the patient's goals for therapy.      Medical Necessity for 22831 sessions, if applicable: (Longer session justified and medically necessary  "due to:)  []  Use of technique that take longer to implement (e.g. EMDR techniques/Systematic Desensitization/ DBT Protocol)  [x]  Severity of symptoms  []  More session time needed to process trauma-related emotions  []  More time needed to create and monitor relapse prevention protocols  [x]  Client has dual diagnosis -- requires extra time to address both diagnoses   []  Need to manage high-conflict interactions of couple/family  []  Extra time needed to monitor crisis issues and safety planning  []  Client has extreme difficulty identifying [emotions/motivations], requires more session time to process and gain insights   []  Not applicable  []  Other:        Risk Parameters:  Patient reports no suicidal ideation  Patient reports no homicidal ideation  Patient reports no self-injurious behavior  Patient reports no violent behavior    Assessment & Plan    Provided ACT to understand how he can align his values to his life. Practiced in session. Supported his efforts at speaking honestly with his wife and asking for physical touch. Discussed living in our intention versus a reaction of the past. Encouraged daily quick one minute check in to deactivate the stress response and align behaviors towards goals and values. Encouraged his career exploration and completing his "field guide."     Pt instructed to call clinic, 911 or go to nearest emergency room if sxs worsen or pt is in crisis or suicidal. The pt expresses understanding.    Psychotherapy:   Target symptom(s):   Why chosen therapy is appropriate versus another modality: CBT used; relevant to diagnosis, patient responds to this modality  Outcome monitoring methods: self-report, observation  Therapeutic intervention type: individual  Topics discussed/themes: building skills sets for symptom management and symptom recognition  The patient's response to the intervention and treatment is: good  The patient's progress toward treatment goals: good      Each patient " to whom he or she provides medical services by telemedicine is:  (1) informed of the relationship between the physician and patient and the respective role of any other health care provider with respect to management of the patient; and (2) notified that he or she may decline to receive medical services by telemedicine and may withdraw from such care at any time.    This note was generated with the assistance of ambient listening technology. Verbal consent was obtained by the patient and accompanying visitor(s) for the recording of patient appointment to facilitate this note. I attest to having reviewed and edited the generated note for accuracy, though some syntax or spelling errors may persist. Please contact the author of this note for any clarification.     Bubba Saul PsyD  Licensed Clinical Psychologist

## 2025-06-23 RX ORDER — FLUOXETINE 20 MG/1
20 CAPSULE ORAL
Qty: 90 CAPSULE | Refills: 0 | Status: SHIPPED | OUTPATIENT
Start: 2025-06-23

## 2025-06-25 ENCOUNTER — OFFICE VISIT (OUTPATIENT)
Dept: PSYCHIATRY | Facility: CLINIC | Age: 45
End: 2025-06-25
Payer: COMMERCIAL

## 2025-06-25 DIAGNOSIS — F33.1 MODERATE EPISODE OF RECURRENT MAJOR DEPRESSIVE DISORDER: ICD-10-CM

## 2025-06-25 DIAGNOSIS — F41.1 GAD (GENERALIZED ANXIETY DISORDER): Primary | ICD-10-CM

## 2025-06-25 PROCEDURE — 1159F MED LIST DOCD IN RCRD: CPT | Mod: CPTII,95,, | Performed by: PSYCHOLOGIST

## 2025-06-25 PROCEDURE — 90837 PSYTX W PT 60 MINUTES: CPT | Mod: 95,,, | Performed by: PSYCHOLOGIST

## 2025-06-25 NOTE — PROGRESS NOTES
"  Outpatient Psychiatry Telemedicine Therapy Visit  [facility] - PSYCHIATRY  [facility] [place]   06/25/2025  Anirudh Stokes       History of Present Illness    CHIEF COMPLAINT:  Patient presents for follow-up to discuss his mood, occupational stress, and relationship issues.    HPI:  Patient reports that his mood has been good this week, describing himself as "generally well" and "above board". He states that there is nothing "super pressing or awful" to discuss during this session. 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 He struggles with the idea of closing the business due to concerns about letting clients down and is considering exploring other career options, including potentially working in real estate or finding a job that better aligns with his values and skills. Patient discusses challenges in his relationship with his wife, Nancy, mentioning differences in their approaches to mealtimes and punctuality. He values sitting down for meals together and being on time, while his wife has a more relaxed approach. Patient expresses a desire to improve communication with his wife about these issues and to find ways to connect more intentionally. He acknowledges struggles with time management and decision-making, describing himself as currently "undisciplined" and having "lost a lot of my integrity" in terms of scheduling and productivity. Patient expresses a desire to improve his focus and ability to make progress on personal and professional goals. He mentions ongoing work to understand his personal values and decision-making principles, expressing interest in continuing to explore these topics, potentially using ACT (Acceptance and Commitment Therapy) framework.    LIFESTYLE:  Patient currently runs his own insurance agency but is dissatisfied with it. He previously worked " for a company called [employer], which he enjoyed and found fulfilling. He is considering exploring other job opportunities, potentially leveraging his experience in cars, insurance, or sales. Patient expresses stress and lack of fulfillment in his current work situation. He lives with his wife Nancy in a small house. They are looking for a house that would provide a better environment for focused, intentional time together. Patient enjoys cooking, particularly preparing meals from [employer]. He also has an interest in cars and enjoys participating in week-long driving tours. Patient mentions enjoying car shows and related events. He expresses a desire to do more week-long driving tours, which he finds both personally enjoyable and beneficial for business networking.          The patient location is: [place]  The chief complaint leading to consultation is: No chief complaint on file.       Visit type: audiovisual    Face to Face time with patient: 51 minutes  60 minutes of total time spent on the encounter, which includes face to face time and non-face to face time preparing to see the patient (eg, review of tests), Obtaining and/or reviewing separately obtained history, Documenting clinical information in the electronic or other health record, Independently interpreting results (not separately reported) and communicating results to the patient/family/caregiver, or Care coordination (not separately reported).     The session began with a review of the patient's goals for therapy.      Medical Necessity for 47101 sessions, if applicable: (Longer session justified and medically necessary due to:)  []  Use of technique that take longer to implement (e.g. EMDR techniques/Systematic Desensitization/ DBT Protocol)  [x]  Severity of symptoms  []  More session time needed to process trauma-related emotions  []  More time needed to create and monitor relapse prevention protocols  [x]  Client has dual diagnosis -- requires  "extra time to address both diagnoses   []  Need to manage high-conflict interactions of couple/family  []  Extra time needed to monitor crisis issues and safety planning  []  Client has extreme difficulty identifying [emotions/motivations], requires more session time to process and gain insights   []  Not applicable  []  Other:        Risk Parameters:  Patient reports no suicidal ideation  Patient reports no homicidal ideation  Patient reports no self-injurious behavior  Patient reports no violent behavior    Assessment & Plan    IMPRESSION:  - Mood improved.  - Discussed occupational stress and coping strategies.  - Explored ways to build intimacy in marriage, including concept of "speaking one's truth" in relationships and importance of intentional connection time with spouse, such as shared meals.  - Considered potential benefits of structured employment for income stability and discipline, including pros and cons of joining family business vs seeking other employment options.  - Recognized struggle with closing current business due to concerns about disappointing clients.  - Encouraged prioritizing personal needs and values when making career decisions.    RELATIONSHIP ISSUES:  - Patient to communicate desire for more intentional shared mealtimes with spouse to foster connection.  - Recommend creating a dedicated dining space, even if small or temporary, to facilitate focused mealtime interactions.    EMPLOYMENT AND CAREER CONCERNS:  - Patient to explore employment options that provide structure and steady income while allowing time for personal business pursuits.  - Patient to reflect on reasons for potentially closing insurance agency that align with personal values and needs.    DIAGNOSIS:  MDD, recurrent, moderate  JOHANNE  Occupational and relationship stress    FOLLOW-UP:  - Follow up in 2 weeks.          Pt instructed to call clinic, 911 or go to nearest emergency room if sxs worsen or pt is in crisis or " suicidal. The pt expresses understanding.    Psychotherapy:   Target symptom(s):   Why chosen therapy is appropriate versus another modality: CBT used; relevant to diagnosis, patient responds to this modality  Outcome monitoring methods: self-report, observation  Therapeutic intervention type: individual audiovisual session  Topics discussed/themes: building skills sets for symptom management and symptom recognition  The patient's response to the intervention and treatment is: good  The patient's progress toward treatment goals: good      Each patient to whom he or she provides medical services by telemedicine is:  (1) informed of the relationship between the physician and patient and the respective role of any other health care provider with respect to management of the patient; and (2) notified that he or she may decline to receive medical services by telemedicine and may withdraw from such care at any time.    This note was generated with the assistance of ambient listening technology. Verbal consent was obtained by the patient and accompanying visitor(s) for the recording of patient appointment to facilitate this note. I attest to having reviewed and edited the generated note for accuracy, though some syntax or spelling errors may persist. Please contact the author of this note for any clarification.       Bubba Saul PsyD  Licensed Clinical Psychologist

## 2025-07-02 ENCOUNTER — OFFICE VISIT (OUTPATIENT)
Dept: PSYCHIATRY | Facility: CLINIC | Age: 45
End: 2025-07-02
Payer: COMMERCIAL

## 2025-07-02 DIAGNOSIS — F41.1 GAD (GENERALIZED ANXIETY DISORDER): ICD-10-CM

## 2025-07-02 DIAGNOSIS — F33.1 MODERATE EPISODE OF RECURRENT MAJOR DEPRESSIVE DISORDER: Primary | ICD-10-CM

## 2025-07-02 PROCEDURE — 90837 PSYTX W PT 60 MINUTES: CPT | Mod: 95,,, | Performed by: PSYCHOLOGIST

## 2025-07-02 PROCEDURE — 1159F MED LIST DOCD IN RCRD: CPT | Mod: CPTII,95,, | Performed by: PSYCHOLOGIST

## 2025-07-05 NOTE — PROGRESS NOTES
"  Outpatient Psychiatry Therapy Visit  [facility] PSYCHIATRY  [facility], [place]   07/05/2025  Anirudh Stokes     Individual psychotherapy audiovisual session. Face time 54 minutes. The session began with a review of the patient's goals for therapy.    History of Present Illness    CHIEF COMPLAINT:  Patient presents with symptoms of depression, including anger, sleep disturbances, and relationship difficulties with his wife.    HPI:  Patient reports experiencing a "rough couple of days" characterized by intense anger, negative self-talk, and sleep disturbances. He describes closing the curtains, sleeping most of the day, and feeling like a "slug." Patient mentions feeling embarrassed and regretful about his current life situation, stating, "I had it so good and I threw it all away and now I have this."    Patient expresses significant distress regarding his relationship with his wife, describing ongoing conflicts, particularly surrounding an upcoming trip to [place]. He reports feeling frustrated with his wife's indecisiveness and their differing approaches to planning and organization, stating, "I feel like my relationship is incredibly weak," and comparing their dynamic to being on a "losing team."    Patient mentions losing an account due to his absence, likely related to his current depressive episode. He expresses dissatisfaction with his career progression, feeling stagnant or regressed over the past 8-10 years, and reports looking for new job opportunities to improve his situation.    Patient describes frustration with his current living situation, particularly the disorganization and clutter in their rental home. He expresses a desire to make improvements but feels conflicted due to his wife's differing perspective on investing time and energy into a rental property.    While the patient denies current suicidal thoughts, he acknowledges that such thoughts feel "close," " "indicating a potential risk for suicidal ideation. He reports engaging in various coping strategies, including watching psychology podcasts and attempting to process his emotions. Patient acknowledges the need for marital therapy and expresses a desire to improve his situation, stating, "I'm trying to climb out of this."    LIFESTYLE:  Patient's work history includes a recent setback where he lost an account due to absence. He is currently looking for jobs and considering reaching out to a contact at a company in [place], [place] to discuss potential opportunities. Regarding his living situation, he resides in a rental house with his wife, which is owned by a friend. Patient expresses frustration with the state of the house, mentioning issues with clutter and disorganization. His hobbies and interests include cars, as evidenced by his plans to attend a car show or convention in [place] with his wife. In terms of social activities, the patient and his wife were planning to attend the car convention in [place], where they know several other attendees. They occasionally host guests at their home, such as a cousin who stayed with them recently.          Prognosis:   [x]  Prognosis favorable based on client's progress/motivation to participate   []  Other:       Medical Necessity for 36630 sessions, if applicable: (Longer session justified and medically necessary due to:)  []  Use of technique that take longer to implement (e.g. EMDR techniques/Systematic Desensitization/ DBT Protocol)  [x]  Severity of symptoms  []  More session time needed to process trauma-related emotions  []  More time needed to create and monitor relapse prevention protocols  [x]  Client has dual diagnosis -- requires extra time to address both diagnoses   [x]  Need to manage high-conflict interactions of couple/family  []  Extra time needed to monitor crisis issues and safety planning  []  Client has extreme difficulty identifying " "[emotions/motivations], requires more session time to process and gain insights   []  Not applicable  []  Other:        Risk Parameters:  Patient reports suicidal ideation this week. He has no intent or plan to harm himself. He was advised to go to his local ER if her felt suicidal.   Patient reports no homicidal ideation  Patient reports no self-injurious behavior  Patient reports no violent behavior       Assessment & Plan    IMPRESSION:  - Presenting with symptoms of major depressive episode: low mood, anhedonia, sleep disturbances, and social withdrawal.  - Identified key stressors contributing to depression: marital discord, occupational dissatisfaction, and caregiver burden for in-laws.  - Recommend embracing and acknowledging current depressive state as a form of self-compassion.  - Explained the concept of situational depression and its impact on relationships and daily functioning.  - Advised waiting on making major decisions until depressive symptoms improve.  - Highlighted the patient's "truth" when he expressed it. Therapist supported him in noticing his depression was getting better.   DEPRESSION:  - Educated on the nature of depression as a "folding in" on oneself and a signal for deep rest.  - Patient to practice self-compassion and avoid self-blame, allowing self to experience depression without judgment or attempts to "fix" it.  - Consider environmental improvements when feeling better (e.g. home organization).    MARITAL COUNSELING:  - Referred to marital therapy for patient and spouse.    DIAGNOSIS  MDD, recurrent, moderate  JOHANNE    FOLLOW-UP:  - Follow up to schedule next appointment.          Pt instructed to call clinic, 911 or go to nearest emergency room if sxs worsen or pt is in crisis or suicidal. The pt expresses understanding.      This note was generated with the assistance of ambient listening technology. Verbal consent was obtained by the patient and accompanying visitor(s) for the " recording of patient appointment to facilitate this note. I attest to having reviewed and edited the generated note for accuracy, though some syntax or spelling errors may persist. Please contact the author of this note for any clarification.   }  Bubba Saul PsyD  Licensed Clinical Psychologist

## 2025-07-07 ENCOUNTER — TELEPHONE (OUTPATIENT)
Dept: PSYCHIATRY | Facility: CLINIC | Age: 45
End: 2025-07-07
Payer: COMMERCIAL

## 2025-07-16 ENCOUNTER — OFFICE VISIT (OUTPATIENT)
Dept: PSYCHIATRY | Facility: CLINIC | Age: 45
End: 2025-07-16
Payer: COMMERCIAL

## 2025-07-16 VITALS
DIASTOLIC BLOOD PRESSURE: 72 MMHG | HEIGHT: 69 IN | WEIGHT: 202.19 LBS | SYSTOLIC BLOOD PRESSURE: 108 MMHG | BODY MASS INDEX: 29.95 KG/M2 | HEART RATE: 72 BPM

## 2025-07-16 DIAGNOSIS — F33.1 MODERATE EPISODE OF RECURRENT MAJOR DEPRESSIVE DISORDER: ICD-10-CM

## 2025-07-16 DIAGNOSIS — F41.1 GAD (GENERALIZED ANXIETY DISORDER): Primary | ICD-10-CM

## 2025-07-16 PROCEDURE — 99999 PR PBB SHADOW E&M-EST. PATIENT-LVL III: CPT | Mod: PBBFAC,,, | Performed by: NURSE PRACTITIONER

## 2025-07-16 PROCEDURE — 3078F DIAST BP <80 MM HG: CPT | Mod: CPTII,S$GLB,, | Performed by: NURSE PRACTITIONER

## 2025-07-16 PROCEDURE — 3074F SYST BP LT 130 MM HG: CPT | Mod: CPTII,S$GLB,, | Performed by: NURSE PRACTITIONER

## 2025-07-16 PROCEDURE — 1159F MED LIST DOCD IN RCRD: CPT | Mod: CPTII,S$GLB,, | Performed by: NURSE PRACTITIONER

## 2025-07-16 PROCEDURE — 3008F BODY MASS INDEX DOCD: CPT | Mod: CPTII,S$GLB,, | Performed by: NURSE PRACTITIONER

## 2025-07-16 PROCEDURE — 99214 OFFICE O/P EST MOD 30 MIN: CPT | Mod: S$GLB,,, | Performed by: NURSE PRACTITIONER

## 2025-07-16 PROCEDURE — 1160F RVW MEDS BY RX/DR IN RCRD: CPT | Mod: CPTII,S$GLB,, | Performed by: NURSE PRACTITIONER

## 2025-07-16 NOTE — PROGRESS NOTES
"Outpatient Psychiatry Follow-Up Visit    Clinical Status of Patient: Outpatient (Ambulatory)  07/16/2025     Chief Complaint: Pt is a 45 yo M who presents today for a follow-up. Met with patient.       Interval History and Content of Current Session:  Interim Events/Subjective Report/Content of Current Session:  follow up appointment.    Pt is a 45 yo M  with past psychiatric hx of mood disorder NOS, JOHANNE  who presents for follow up treatment. He is only taking Prozac 20mg QD which has been therapeutic and well-tolerated.       Patient presents for a follow-up visit to discuss his mental health status and medication management, approximately two months since his last appointment.      Patient reports an overall improvement in his mental health over the past couple of months, stating he has "more good days than bad" and feels he is making forward progress in various aspects of his life, including his relationship with his wife, his business, and his personal mental health issues. He has been attending weekly therapy sessions with psycholgy here, which he finds helpful in addressing his various life stressors and personal issues.    Patient is currently taking Prozac 20mg daily for anxiety and mood management. He reports feeling generally satisfied with the current dosage but expresses a desire to eventually discontinue the medication entirely when he feels mentally stable enough to do so. He describes a single episode of dizziness or vertigo-like symptoms during a recent trip at a cocktail party, which lasted briefly. He is unsure if this was related to the Prozac, alcohol consumption, stress, or other factors.    Patient mentions several ongoing stressors in his life, including caregiving responsibilities for his aging in-laws with significant medical needs, strain on his relationship with his wife due to caregiving duties, stress related to being an  and running a business, and considerations about " "potentially changing the direction of his business. He generally sleeps okay, although specific details about sleep quality or duration were not provided. Patient denies any persistent or worsening symptoms related to his mental health condition.    LIFESTYLE:  Patient is an  running his own business. He experiences stress related to his work and is considering potential changes in the direction of his business. He lives with his wife and has a mortgage to pay. Patient recently returned from a week-long work vacation, which allowed him to decompress and recharge. He recently attended a cocktail party during a work-related event.              Past Psychiatric hx: 43 yo M with hx of "depression" presenting for initial eval and med mgmt. PMHx detailed below. Currently on Lexapro 10mg QD (x ~2 yrs, reduced this on his own from 20mg QD one week ago), previously on Wellbutrin 75mg QD (started 6-8 mos ago, stopped ~2 wks ago). Past Zoloft trial d/c due to SEs. Sees counselor weekly. Hx of depressive sx since high school. 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 Reports feeling "stuck in my own head" and "I just dont feel like myself anymore." Denies any hx of inpatient psychiatric admissions. Has increased drinking to 1-2 cocktails daily.      Past Medical hx:   Past Medical History:   Diagnosis Date    Allergic to bees     Decreased  strength of right hand 02/06/2023    Migraine     ALLYN (obstructive sleep apnea) 02/07/2022    per dr boyce              Review of Systems   PSYCHIATRIC: Pertinent items are noted in the narrative.        CONSTITUTIONAL: weight stable        M/S: no pain today         ENT: no allergies noted today        ABD: no n/v/d     Past Medical, Family and Social History: The patient's past medical, family and social history have been reviewed and updated as appropriate within the electronic medical record. See encounter " notes.          Risk Parameters:  Patient reports no suicidal ideation  Patient reports no homicidal ideation  Patient reports no self-injurious behavior  Patient reports no violent behavior     Exam (detailed: at least 9 elements; comprehensive: all 15 elements)   Constitutional  Vitals:  Most recent vital signs, dated less than 90 days prior to this appointment, were reviewed.      General:  unremarkable, age appropriate, casual attire, good eye contact, good rapport       Musculoskeletal  Muscle Strength/Tone:  no flaccidity, no tremor    Gait & Station:  normal      Psychiatric                       Speech:  normal tone, normal rate, rhythm, and volume   Mood & Affect:   Euthymic, congruent, appropriate         Thought Process:   Goal directed; Linear    Associations:   intact   Thought Content:   No SI/HI, delusions, or paranoia, no AV/VH   Insight & Judgement:   Good, adequate to circumstances   Orientation:   grossly intact; alert and oriented x 4    Memory:  intact for content of interview    Language:  grossly intact, can repeat    Attention Span  : Grossly intact for content of interview   Fund of Knowledge:   intact and appropriate to age and level of education        Assessment and Diagnosis   Status/Progress: Based on the examination today, the patient's problem(s) is/are under fair control.  New problems have not been presented today. Comorbidities are not currently complicating management of the primary condition.      Impression:         Assessment & Plan    IMPRESSION:  - Assessed response to current Prozac 20 mg daily regimen, noting overall improvement in mental health with more good days than bad.  - Determined it is premature to taper medication at this time given current stability.    - Recognized importance of ongoing psychotherapy with psychology as complementary to medication management.    MAJOR DEPRESSIVE DISORDER:  - Continue Prozac 20 mg daily.      DIZZINESS:  - Contact office if  dizziness episodes become more frequent or form a pattern.    FOLLOW-UP ENCOUNTER:  - Follow up in 4 months.  - Use patient portal to message provider if earlier appointment is needed, especially for medication-related concerns.  - Virtual visit option available for quicker access if needed.         He continues to follow with psychology weekly.      Diagnosis: mood disorder NOS    Intervention/Counseling/Treatment Plan   Medication Management:      1. Cont Prozac 20mg QD      4. Call to report any worsening of symptoms or problems with the medication. Pt instructed to go to ER with thoughts of harming self, others     5. Patient given contact # for psychotherapists at [facility] and also instructed she may check with insurance for list of providers.      6. Labs: none         Return to clinic: 4 mo, self schedule    -Spent 30min face to face with the pt; >50% time spent in counseling   -Supportive therapy and psychoeducation provided  -R/B/SE's of medications discussed with the pt who expresses understanding and chooses to take medications as prescribed.   -Pt instructed to call clinic, 911 or go to nearest emergency room if sxs worsen or pt is in   crisis. The pt expresses understanding.    Jere Maldonado, NP

## 2025-07-23 ENCOUNTER — OFFICE VISIT (OUTPATIENT)
Dept: PSYCHIATRY | Facility: CLINIC | Age: 45
End: 2025-07-23
Payer: COMMERCIAL

## 2025-07-23 DIAGNOSIS — F41.1 GAD (GENERALIZED ANXIETY DISORDER): ICD-10-CM

## 2025-07-23 DIAGNOSIS — F33.1 MODERATE EPISODE OF RECURRENT MAJOR DEPRESSIVE DISORDER: Primary | ICD-10-CM

## 2025-07-23 PROCEDURE — 1159F MED LIST DOCD IN RCRD: CPT | Mod: CPTII,95,, | Performed by: PSYCHOLOGIST

## 2025-07-23 PROCEDURE — 90837 PSYTX W PT 60 MINUTES: CPT | Mod: 95,,, | Performed by: PSYCHOLOGIST

## 2025-07-24 NOTE — PROGRESS NOTES
Outpatient Psychiatry Telemedicine Therapy Visit  [facility] PSYCHIATRY  [facility], [place]   07/24/2025  Anirudh Stokes        History of Presenting Illness:      Pt presents for a follow up visit. Patient was seen, examined and chart was reviewed. Patient reviewed and agreed to informed consent and limits of confidentiality.    The patient location is: [place]  The chief complaint leading to consultation is: No chief complaint on file.       Visit type: audiovisual    Face to Face time with patient: 50  60 minutes of total time spent on the encounter, which includes face to face time and non-face to face time preparing to see the patient (eg, review of tests), Obtaining and/or reviewing separately obtained history, Documenting clinical information in the electronic or other health record, Independently interpreting results (not separately reported) and communicating results to the patient/family/caregiver, or Care coordination (not separately reported).     The session started with a review of therapy goal(s).     Interval History and Content of Current Session:  Content of Current Session:  Last session date: 7/16/2025     Problem(s) Discussed:  Patient reports ongoing depression. He is back from a vacation. Continues to have problems with his marriage. He has started looking for a marital therapist. 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  He would like help with depression. We re established our goals for treatment. Gathered history of his depression. Encouraged him to stay out of bed and consider working other places besides home. He has suicidal thoughts but no intent or plan. Will need to refer for medication evaluation.     Symptoms/Impairment:   Suicidal thoughts, down days, depressed, no motivation, anhedonia    In-session Interventions:  ACT treatment of depression and occupational stress    Prognosis:   [x]  Prognosis favorable based on client's  progress/motivation to participate   []  Other:       Medical Necessity for 47645 sessions, if applicable: (Longer session justified and medically necessary due to:)  []  Use of technique that take longer to implement (e.g. EMDR techniques/Systematic Desensitization/ DBT Protocol)  [x]  Severity of symptoms  []  More session time needed to process trauma-related emotions  []  More time needed to create and monitor relapse prevention protocols  [x]  Client has dual diagnosis -- requires extra time to address both diagnoses   []  Need to manage high-conflict interactions of couple/family  []  Extra time needed to monitor crisis issues and safety planning  []  Client has extreme difficulty identifying [emotions/motivations], requires more session time to process and gain insights   []  Not applicable  []  Other:        Risk Parameters:  Patient reports no suicidal ideation  Patient reports no homicidal ideation  Patient reports no self-injurious behavior  Patient reports no violent behavior    Diagnostic Impressions:  Diagnosis(es):   MDD, moderate, recurrent  JOHANNE    Treatment Plan:  1. Continue with Short-term therapy with this author     2. Pt instructed to call clinic, 911 or go to nearest emergency room if sxs worsen or pt is in crisis or suicidal. The pt expresses understanding.    Psychotherapy:   Target symptom(s):   Why chosen therapy is appropriate versus another modality: CBT used; relevant to diagnosis, patient responds to this modality  Outcome monitoring methods: self-report, observation  Therapeutic intervention type: individual audiovisual psychotherapy session  Topics discussed/themes: building skills sets for symptom management and symptom recognition  The patient's response to the intervention and treatment is: good  The patient's progress toward treatment goals: good     Return to clinic:   2 weeks    Bubba Saul PsyD     Each patient to whom he or she provides medical services by telemedicine  is:  (1) informed of the relationship between the physician and patient and the respective role of any other health care provider with respect to management of the patient; and (2) notified that he or she may decline to receive medical services by telemedicine and may withdraw from such care at any time.

## 2025-07-29 ENCOUNTER — PATIENT MESSAGE (OUTPATIENT)
Dept: PSYCHIATRY | Facility: CLINIC | Age: 45
End: 2025-07-29
Payer: COMMERCIAL

## 2025-07-30 ENCOUNTER — OFFICE VISIT (OUTPATIENT)
Dept: PSYCHIATRY | Facility: CLINIC | Age: 45
End: 2025-07-30
Payer: COMMERCIAL

## 2025-07-30 DIAGNOSIS — F33.1 MODERATE EPISODE OF RECURRENT MAJOR DEPRESSIVE DISORDER: ICD-10-CM

## 2025-07-30 DIAGNOSIS — F41.1 GAD (GENERALIZED ANXIETY DISORDER): Primary | ICD-10-CM

## 2025-07-30 PROCEDURE — 1159F MED LIST DOCD IN RCRD: CPT | Mod: CPTII,95,, | Performed by: PSYCHOLOGIST

## 2025-07-30 PROCEDURE — 90837 PSYTX W PT 60 MINUTES: CPT | Mod: 95,,, | Performed by: PSYCHOLOGIST

## 2025-07-30 NOTE — PROGRESS NOTES
"   Outpatient Psychiatry Telemedicine Therapy Visit  [facility] PSYCHIATRY  [facility], [place]   07/30/2025  Anirudh Stokes      History of Present Illness    CHIEF COMPLAINT:  Patient presents with worsening depression symptoms, including lack of interest, low mood, passive suicidal thoughts and social withdrawal.    HPI:  Patient reports significant lack of interest in activities and helping others, describing it as "heavy and awful." He expresses feeling like he's "going backwards" compared to his peers, spending an entire day on the couch recently, unable to find anything to help him feel better. 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 Helping others "feels like a polar opposite of what I should be doing."    Patient indicates that his relationship status is contributing to his depressive symptoms, mentioning that he and his wife have not found a marriage counselor yet.    Patient describes feeling conflicted about a friend's recent career success, which mirrors his former position. While happy for his friend, he struggles with comparing himself, stating, "I have not found that place or purpose" since leaving his previous job.    Patient identifies with the concept of recovering from burnout, saying, "All I've done is give and bend over and I'm not happy with where I'm at." He expresses a need to regroup before being able to help others again.    Despite his overall low mood, the patient describes some positive experiences, such as helping friends with a house renovation project. He found this activity engaging and exciting, noting it "got me excited" and allowed him to use his skills and knowledge.    Patient " has current suicidal ideation.But denies intent or plan to harm himself.     Pt and his wife are in a caregiver role to his wife's father who is difficult.     LIFESTYLE:  Patient previously worked for [employer] and mentions having an insurance business, but expresses difficulty maintaining it and reluctance to recruit new business. He also references a former  in [place]. He has a strong interest in cars and car shows, attending car events and conventions, and expresses a desire to be a car  and shower. Patient enjoys projects and brainstorming about house renovations. He attends car conventions and events, recently attending one in [place] or [place]. He mentions enjoying walking through a house with friends and making funny videos. However, he also expresses feeling depressed and withdrawing from social activities, such as excusing himself from family dinners.         The patient location is: [place]  The chief complaint leading to consultation is: No chief complaint on file.     Visit type: audiovisual    Face to Face time with patient: 52  60 minutes of total time spent on the encounter, which includes face to face time and non-face to face time preparing to see the patient (eg, review of tests), Obtaining and/or reviewing separately obtained history, Documenting clinical information in the electronic or other health record, Independently interpreting results (not separately reported) and communicating results to the patient/family/caregiver, or Care coordination (not separately reported).       Medical Necessity for 17907 sessions, if applicable: (Longer session justified and medically necessary due to:)  []  Use of technique that take longer to implement (e.g. EMDR techniques/Systematic Desensitization/ DBT Protocol)  [x]  Severity of symptoms  []  More session time needed to process trauma-related emotions  []  More time needed to create and monitor relapse prevention  "protocols  [x]  Client has dual diagnosis -- requires extra time to address both diagnoses   []  Need to manage high-conflict interactions of couple/family  []  Extra time needed to monitor crisis issues and safety planning  []  Client has extreme difficulty identifying [emotions/motivations], requires more session time to process and gain insights   []  Not applicable  []  Other:        Risk Parameters:  Patient reports suicidal ideation  Patient reports no homicidal ideation  Patient reports no self-injurious behavior  Patient reports no violent behavior    Assessment & Plan    IMPRESSION:  - Assessed current depressive symptoms and determined current medication regimen is ineffective.    MAJOR DEPRESSIVE DISORDER:  - Discussed depression as a "downward spiral" and the concept of working towards an "upward spiral" through small, progressive steps.  - Emphasized the importance of behavioral activation in managing depression.  - Focused on behavioral activation strategies as initial non-pharmacological intervention for depression management.  - Recommend reading "The Upward Spiral Workbook" for additional information on depression management strategies.  - Patient to break down work tasks into minimum required activities (renewals, service work, follow-ups).  - Patient to complete work tasks in the morning after walk and breakfast when energy levels are highest.  - Patient to give self credit for task completion to build positive momentum.  - Patient to pay attention to negative thoughts and beliefs that arise during activities.  - Recommend scheduling appointment with prescribing physician to discuss medication adjustments.  - Will attach note from today's visit for prescribing physician to review.    EXERCISE THERAPY:  - Patient to continue daily walks for exercise.    FOLLOW-UP ENCOUNTER:  - Follow up to discuss progress with behavioral activation strategies at next appointment.  - Contact the office to report " any barriers encountered when implementing activation plan.          Pt instructed to call clinic, 911 or go to nearest emergency room if sxs worsen or pt is in crisis or suicidal. The pt expresses understanding.    Psychotherapy:   Target symptom(s):   Why chosen therapy is appropriate versus another modality: CBT used; relevant to diagnosis, patient responds to this modality  Outcome monitoring methods: self-report, observation  Therapeutic intervention type: CBT  Topics discussed/themes: building skills sets for symptom management and symptom recognition  The patient's response to the intervention and treatment is: good  The patient's progress toward treatment goals: good      Each patient to whom he or she provides medical services by telemedicine is:  (1) informed of the relationship between the physician and patient and the respective role of any other health care provider with respect to management of the patient; and (2) notified that he or she may decline to receive medical services by telemedicine and may withdraw from such care at any time.    This note was generated with the assistance of ambient listening technology. Verbal consent was obtained by the patient and accompanying visitor(s) for the recording of patient appointment to facilitate this note. I attest to having reviewed and edited the generated note for accuracy, though some syntax or spelling errors may persist. Please contact the author of this note for any clarification.     uBbba Saul PsyD  Licensed Clinical Psychologist

## 2025-08-06 ENCOUNTER — OFFICE VISIT (OUTPATIENT)
Dept: PSYCHIATRY | Facility: CLINIC | Age: 45
End: 2025-08-06
Payer: COMMERCIAL

## 2025-08-06 DIAGNOSIS — F41.1 GAD (GENERALIZED ANXIETY DISORDER): ICD-10-CM

## 2025-08-06 DIAGNOSIS — F33.2 MAJOR DEPRESSIVE DISORDER, RECURRENT SEVERE WITHOUT PSYCHOTIC FEATURES: Primary | ICD-10-CM

## 2025-08-06 PROCEDURE — 90837 PSYTX W PT 60 MINUTES: CPT | Mod: 95,,, | Performed by: PSYCHOLOGIST

## 2025-08-06 PROCEDURE — 1159F MED LIST DOCD IN RCRD: CPT | Mod: CPTII,95,, | Performed by: PSYCHOLOGIST

## 2025-08-06 NOTE — PROGRESS NOTES
"Office Visit  [facility]   08/06/2025  Anirudh Stokes     Progress Note    Therapeutic Intervention: 55233 Individual Psychotherapy      Chief Complaint/Reason for Encounter: Major depression, severe, JOHANNE    History of Present Illness    CHIEF COMPLAINT:  Patient presents for ongoing treatment of major depressive disorder, with focus on cognitive behavioral therapy (CBT) techniques and mood improvement strategies.    HPI:  Patient reports ongoing symptoms of depression, including negative thought patterns and difficulty initiating conversations with his wife. He describes feeling a "tightness in my chest" and that everything feels like "being stepped on by an elephant." Patient has been making efforts to manage his symptoms, including daily walks and attempting to track his moods and activities.    Patient expressed frustration and disappointment regarding communication difficulties with his wife. He described feeling that conversations with her are like "the tallest, thickest brick wall I've ever had to climb in my life" and that he often avoids bringing up topics to prevent conflict. This avoidance leads to feelings of frustration and chest tightness.    Patient reported engaging in daily walks, which he uses as a time for self-reflection. He has been working on incorporating more movement and stretching into his routine. Patient also mentioned interest in exploring breathwork techniques for stress management.    Patient indicated some improvement in his work habits, stating, "I've had more hours at my desk. Now whether or not I'm really efficient, but I'm at least at my desk and not sleeping." However, he acknowledged that follow-ups and renewals are still lagging, suggesting ongoing difficulties with work-related tasks. Patient denies needing to take naps to recover during the work week.    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 He enjoys going for daily walks and gardening as his hobbies and interests.          The patient location is: [place]  The chief complaint leading to consultation is: depression and anxiety    Visit type: audiovisual    Face to Face time with patient: 55 minutes  60 minutes of total time spent on the encounter, which includes face to face time and non-face to face time preparing to see the patient (eg, review of tests), Obtaining and/or reviewing separately obtained history, Documenting clinical information in the electronic or other health record, Independently interpreting results (not separately reported) and communicating results to the patient/family/caregiver, or Care coordination (not separately reported).       Medical Necessity for 58209 sessions, if applicable: (Longer session justified and medically necessary due to:)  []  Use of technique that take longer to implement (e.g. EMDR techniques/Systematic Desensitization/ DBT Protocol)  [x]  Severity of symptoms  []  More session time needed to process trauma-related emotions  []  More time needed to create and monitor relapse prevention protocols  [x]  Client has dual diagnosis -- requires extra time to address both diagnoses   []  Need to manage high-conflict interactions of couple/family  []  Extra time needed to monitor crisis issues and safety planning  []  Client has extreme difficulty identifying [emotions/motivations], requires more session time to process and gain insights   []  Not applicable  []  Other:        Risk Parameters:  Patient reports no suicidal ideation  Patient reports no homicidal ideation  Patient reports no self-injurious behavior  Patient reports no violent behavior        Assessment & Plan    IMPRESSION:  - Major depressive episode, severe.  - Recommend medication evaluation with current provider.  - Introduced cognitive behavioral therapy (CBT) techniques to address depressive  "thoughts and improve mood.  - Utilizing "The Upward Spiral" book to educate patient on neurological aspects of depression.  - Implementing mood tracking to monitor progress.  - Considered alternative CBT approach using  School materials for potentially more engaging format.    MAJOR DEPRESSIVE DISORDER:  - Explained neurological basis of depression focusing on brain structures involved and impact of negative thought patterns on mood and physical symptoms.  - Discussed CBT concepts, including CTFAR model (Circumstances, Thoughts, Feelings, Actions, Results).  - Patient to read Chapter 1 of "The Upward Spiral" book.  - Patient to practice daily thought downloads, writing down thoughts without filtering.  - Patient to implement CTFAR model to analyze 1 thought at a time.    EXERCISE THERAPY:  - Patient to continue daily walks.    FOLLOW-UP VISIT:  - Follow up in 1 week to continue CBT work and review progress.          Patient instructed to call clinic, [phone] or present to the nearest emergency room if they experience suicidal or homicidal ideation, intent or plan.    Treatment Plan:   Target symptom(s):   Why chosen therapy is appropriate versus another modality: CBT used; relevant to diagnosis, patient responds to this modality  Outcome monitoring methods: self-report, observation  Therapeutic intervention type: CBT  Topics discussed/themes: building skills sets for symptom management and symptom recognition  The patient's response to the intervention and treatment is: good  The patient's progress toward treatment goals: good    Diagnosis:   MDD, recurrent severe  JOHANNE    Return to Clinic: 1 week    Each patient to whom he or she provides medical services by telemedicine is:  (1) informed of the relationship between the physician and patient and the respective role of any other health care provider with respect to management of the patient; and (2) notified that he or she may decline to receive medical " services by telemedicine and may withdraw from such care at any time.    This note was generated with the assistance of ambient listening technology. Verbal consent was obtained by the patient and accompanying visitor(s) for the recording of patient appointment to facilitate this note. I attest to having reviewed and edited the generated note for accuracy, though some syntax or spelling errors may persist. Please contact the author of this note for any clarification.       Bubba Saul PsyD  Licensed Clinical Psychologist

## 2025-08-08 ENCOUNTER — PATIENT MESSAGE (OUTPATIENT)
Dept: PSYCHIATRY | Facility: CLINIC | Age: 45
End: 2025-08-08
Payer: COMMERCIAL

## 2025-08-11 RX ORDER — FLUOXETINE HYDROCHLORIDE 40 MG/1
40 CAPSULE ORAL DAILY
Qty: 30 CAPSULE | Refills: 3 | Status: SHIPPED | OUTPATIENT
Start: 2025-08-11

## 2025-08-13 ENCOUNTER — OFFICE VISIT (OUTPATIENT)
Dept: PSYCHIATRY | Facility: CLINIC | Age: 45
End: 2025-08-13
Payer: COMMERCIAL

## 2025-08-13 DIAGNOSIS — F33.1 MODERATE EPISODE OF RECURRENT MAJOR DEPRESSIVE DISORDER: Primary | ICD-10-CM

## 2025-08-13 PROCEDURE — 1159F MED LIST DOCD IN RCRD: CPT | Mod: CPTII,95,, | Performed by: PSYCHOLOGIST

## 2025-08-13 PROCEDURE — 90837 PSYTX W PT 60 MINUTES: CPT | Mod: 95,,, | Performed by: PSYCHOLOGIST

## 2025-08-20 ENCOUNTER — OFFICE VISIT (OUTPATIENT)
Dept: PSYCHIATRY | Facility: CLINIC | Age: 45
End: 2025-08-20
Payer: COMMERCIAL

## 2025-08-20 DIAGNOSIS — F41.1 GAD (GENERALIZED ANXIETY DISORDER): Primary | ICD-10-CM

## 2025-08-20 DIAGNOSIS — F33.1 MODERATE EPISODE OF RECURRENT MAJOR DEPRESSIVE DISORDER: ICD-10-CM

## 2025-08-20 PROCEDURE — 1159F MED LIST DOCD IN RCRD: CPT | Mod: CPTII,95,, | Performed by: PSYCHOLOGIST

## 2025-08-20 PROCEDURE — 90837 PSYTX W PT 60 MINUTES: CPT | Mod: 95,,, | Performed by: PSYCHOLOGIST

## 2025-08-27 ENCOUNTER — PATIENT MESSAGE (OUTPATIENT)
Dept: PSYCHIATRY | Facility: CLINIC | Age: 45
End: 2025-08-27
Payer: COMMERCIAL